# Patient Record
Sex: FEMALE | Race: WHITE | Employment: STUDENT | URBAN - METROPOLITAN AREA
[De-identification: names, ages, dates, MRNs, and addresses within clinical notes are randomized per-mention and may not be internally consistent; named-entity substitution may affect disease eponyms.]

---

## 2023-11-09 NOTE — PROGRESS NOTES
Patient here for (+) upt at home and states LMP- 9/25/23. Patient states she has had diarrhea since Friday and is not feeling well today.

## 2023-11-13 ENCOUNTER — TELEPHONE (OUTPATIENT)
Age: 33
End: 2023-11-13

## 2023-11-13 ENCOUNTER — OFFICE VISIT (OUTPATIENT)
Age: 33
End: 2023-11-13
Payer: COMMERCIAL

## 2023-11-13 ENCOUNTER — OFFICE VISIT (OUTPATIENT)
Age: 33
End: 2023-11-13

## 2023-11-13 ENCOUNTER — PROCEDURE VISIT (OUTPATIENT)
Age: 33
End: 2023-11-13
Payer: COMMERCIAL

## 2023-11-13 VITALS
WEIGHT: 200 LBS | TEMPERATURE: 97.8 F | BODY MASS INDEX: 34.15 KG/M2 | HEIGHT: 64 IN | SYSTOLIC BLOOD PRESSURE: 100 MMHG | OXYGEN SATURATION: 98 % | HEART RATE: 84 BPM | RESPIRATION RATE: 12 BRPM | DIASTOLIC BLOOD PRESSURE: 70 MMHG

## 2023-11-13 VITALS
SYSTOLIC BLOOD PRESSURE: 110 MMHG | HEIGHT: 64 IN | DIASTOLIC BLOOD PRESSURE: 74 MMHG | BODY MASS INDEX: 34.02 KG/M2 | HEART RATE: 92 BPM | WEIGHT: 199.3 LBS

## 2023-11-13 DIAGNOSIS — K52.9 GASTROENTERITIS: Primary | ICD-10-CM

## 2023-11-13 DIAGNOSIS — Z98.891 HISTORY OF CESAREAN SECTION: ICD-10-CM

## 2023-11-13 DIAGNOSIS — O21.9 NAUSEA AND VOMITING IN PREGNANCY: Primary | ICD-10-CM

## 2023-11-13 DIAGNOSIS — N91.2 AMENORRHEA: Primary | ICD-10-CM

## 2023-11-13 DIAGNOSIS — Z86.32 HISTORY OF INSULIN CONTROLLED GESTATIONAL DIABETES MELLITUS (GDM): ICD-10-CM

## 2023-11-13 DIAGNOSIS — Z3A.01 6 WEEKS GESTATION OF PREGNANCY: ICD-10-CM

## 2023-11-13 DIAGNOSIS — Z32.01 POSITIVE PREGNANCY TEST: ICD-10-CM

## 2023-11-13 PROBLEM — O24.414 INSULIN CONTROLLED GESTATIONAL DIABETES MELLITUS (GDM) IN THIRD TRIMESTER: Status: ACTIVE | Noted: 2022-02-15

## 2023-11-13 PROCEDURE — 76817 TRANSVAGINAL US OBSTETRIC: CPT | Performed by: OBSTETRICS & GYNECOLOGY

## 2023-11-13 PROCEDURE — 99203 OFFICE O/P NEW LOW 30 MIN: CPT | Performed by: STUDENT IN AN ORGANIZED HEALTH CARE EDUCATION/TRAINING PROGRAM

## 2023-11-13 PROCEDURE — 81025 URINE PREGNANCY TEST: CPT | Performed by: STUDENT IN AN ORGANIZED HEALTH CARE EDUCATION/TRAINING PROGRAM

## 2023-11-13 RX ORDER — ONDANSETRON 4 MG/1
4 TABLET, ORALLY DISINTEGRATING ORAL 3 TIMES DAILY PRN
Qty: 21 TABLET | Refills: 0 | Status: SHIPPED | OUTPATIENT
Start: 2023-11-13

## 2023-11-13 NOTE — TELEPHONE ENCOUNTER
Patient called stating for a week patient has been fatigued and had an upset stomach and stomach pain. States she has had diarrhea for 2 days . Patient is worried because she is 7 weeks pregnant. Patient has apt with her OBGYN this morning. Patient is wondering if she can be seen today. Patient can come in anytime after 1030am. Dr. Brenda Hutton does have an opening in the afternoon if needed.

## 2023-11-13 NOTE — PATIENT INSTRUCTIONS
- Upon examination, the heart sounds are normal. The abdomen is normal to palpation.  - If diarrhea is still present tomorrow, stool studies would be appropriate at that time. Stool studies and labs were ordered today in the event that they are necessary.

## 2023-11-13 NOTE — PROGRESS NOTES
[address] [address]     Date of Visit:  2023  Patient Name: Bert Lewis   Patient :  1990     CHIEF COMPLAINT/HPI:     Chief Complaint   Patient presents with    Abdominal Pain     Since Friday, has stomach pain with diarrhea. HPI      Bert Lewis, 34 y.o. presents today for evaluation of abdominal pain. Today, patient reports that became ill with cold symptoms slightly more than 1 week ago. She experienced fatigue, congestion, sweating, and chills at that time. These symptoms initially resolved after 3 days. She began experiencing stomach pain, nausea, diarrhea, and generalized stomach upset for the last 4 days starting this past Friday. States she went out to eat on Thursday night. Denies fever or chills with most recent symptoms. Denies classic heartburn symptoms. Endorses one event of vomiting on Thursday night. She has been experiencing nausea, but is 6 weeks pregnant and was nauseous in the same way with her first pregnancy. Recent visit with OBGYN today confirmed that her baby is currently healthy. States her gastric symptoms have been improving gradually. REVIEW OF SYSTEM      Review of Systems:   Constitutional:  Negative for chills, fatigue, fever and unexpected weight change. Eyes:  Negative for visual disturbance. Respiratory:  Negative for cough, chest tightness, shortness of breath and wheezing. Cardiovascular:  Negative for chest pain, palpitations and leg swelling. Gastrointestinal:  as above  Genitourinary:  Negative for dysuria, hematuria and urgency. Musculoskeletal:  Negative for back pain, neck pain and neck stiffness. Skin:  Negative for rash and wound. Neurological:  Negative for syncope, weakness, light-headedness and headaches. Hematological:  Negative for adenopathy. Does not bruise/bleed easily. Psychiatric/Behavioral:  Negative for suicidal ideas.  The

## 2023-11-13 NOTE — PROGRESS NOTES
6w1d IUP  HR: 120 bpm  RT. OVARY: Not visualized due to bowel  LT. OVARY: 3.27 x 2.20 x 2.54 cm  Corpus luteum: 1.47 x 1.19 x 1.57 cm  No free fluid  Subchorionic hemorrhage seen superior to gestational sac: 0.43 x 0.53 x 0.31 cm  Subchorionic hemorrhage seen inferior to the gestational sac: 0.83 x 0.49 x 0.44 cm

## 2023-11-13 NOTE — PROGRESS NOTES
Initial Prenatal Visit    [address] [facility],Third Floor      CC: Initial Prenatal Visit    HPI:   Quang Bolton is a 34 y.o. female [id number] at 7w0d by exact LMP  She is being seen today for her first obstetrical visit after positive pregnancy test at home. Pregnancy history fully reviewed. This is a planned pregnancy. Patient's last menstrual period was 2023 (exact date). Her obstetrical history is significant for GDMA2, history C/S x1 due to breech presentation. The patient desires a TOLAC in this pregnancy. The patient was seen and evaluated. The patient reports a current GI bug and about a week ago had some fevers and now has some nausea and diarrhea. She reports keeping fluids and food down. Her nausea is improving but she is still having loose stools. The patient declines the T-Dap Vaccine (27-36 weeks) this pregnancy. The patient is Rh positive and Rhogam is not indicated in this pregnancy. The patient declines the influenza vaccine this year. The patient declines the COVID-19 vaccine this year. Relationship with FOB:   Mother's ethnicity:   Father's ethnicity:   Family History:    - Neural tube defects: No   - Congenital birth defects (congenital heart defects, polydactyly, cleft lip/palate): No   - Intellectual disability: No   - Genetic disorders/chromosomal abnormalities: No   - Diabetes mellitus in first degree relatives: No  Genetic screening was discussed and patient is considering.     OB History:  OB History    Para Term  AB Living   4 1 1 0 2 1   SAB IAB Ectopic Molar Multiple Live Births   2 0 0 0 0 1      # Outcome Date GA Lbr Delio/2nd Weight Sex Delivery Anes PTL Lv   4 Current            3 Term 22 39w0d   M CS-LTranv   LESLIE      Birth Comments: breech   2 SAB 2021           1 SAB 10/2020               Past Medical History:  Past Medical History:   Diagnosis Date    Acne     ASCUS with positive high risk HPV cervical

## 2023-11-27 ENCOUNTER — PROCEDURE VISIT (OUTPATIENT)
Age: 33
End: 2023-11-27
Payer: COMMERCIAL

## 2023-11-27 DIAGNOSIS — O46.8X1 SUBCHORIONIC HEMORRHAGE OF PLACENTA IN FIRST TRIMESTER, SINGLE OR UNSPECIFIED FETUS: ICD-10-CM

## 2023-11-27 DIAGNOSIS — O41.8X10 SUBCHORIONIC HEMORRHAGE OF PLACENTA IN FIRST TRIMESTER, SINGLE OR UNSPECIFIED FETUS: ICD-10-CM

## 2023-11-27 PROCEDURE — 76817 TRANSVAGINAL US OBSTETRIC: CPT | Performed by: OBSTETRICS & GYNECOLOGY

## 2023-11-27 NOTE — PROGRESS NOTES
8w4d IUP  HR: 171 bpm  RT. OVARY: 2.92 x 2.20 x 1.67 cm  Unremarkable  LT. OVARY: 2.58 x 1.99 x 2.42 cm  Corpus luteum: 0.82 x 1.18 x 0.99 cm    Subchorionic hemorrhage previously seen superior to the gestational sac is no longer present  Subchorionic hemorrhage inferior to gestational sac still remains: 0.78 x 0.66 x 0.81 cm    No free fluid

## 2023-12-12 NOTE — PROGRESS NOTES
greater than 250 mg/dL: patient not sure if she has had checked  History of polycystic ovarian syndrome: Yes  A1c greater than or equal to 5.7%: no- (4.9) 1/4/22      ASSESSMENT/EDUCATION:     The following were addressed during this visit:    Trimester 1  - Blood Product Preferences   - Depression Screening   - Environmental/Occupational Hazards   - Genetic Discussion   - Immunizations/Disease Testing   - Medications   - Prenatal Care Expectations   - When to call your Doctor in Pregnancy   - Early Glucola (perform 16-18)     Education Trimester 1  - Infectious Disease Education   - Nutrition/Weight Gain   - Travel   - Lifestyle   - Prenatal Care and Practice Information   - Diet and Weight Gain   - Domestic Abuse Screen   - Breastfeeding Education: First Trimester   - Discuss Share Everywhere and MyChart with Patient   - Travel   - Infectious Disease Education   - Nutrition/Weight Gain        Pregnancy at 10w3d   She was counseled on office policies. She was educated about the anticipated course of prenatal care and routine prenatal labs. Patient has consented to HIV testing and urine drug screen: Yes  Initial Pathways Screen was completed: No-patient declined  Pregnancy Education Checklist initiated and documented above. The patient was counseled on carrier screening at prior visit. She requested CF/SMA/Fragile X testing. See scanned form. The patient has been counseled on the option for 1st trimester screen vs NIPs at prior visit. She verbalized understanding and would like to proceed with: NIPT. See scanned form. The patient was informed that Dr. Jerome Blankenship only delivers at [facility] and is agreeable to delivery at [facility]. She denies tobacco use. The patient was counseled on the risks of tobacco abuse, both maternal and fetal. She was instructed to stop smoking if currently using tobacco. Morbidity, mortality, and cessation programs were reviewed.  The risks include but

## 2023-12-13 ENCOUNTER — HOSPITAL ENCOUNTER (OUTPATIENT)
Age: 33
Setting detail: SPECIMEN
Discharge: HOME | End: 2023-12-13

## 2023-12-13 ENCOUNTER — INITIAL PRENATAL (OUTPATIENT)
Dept: OBGYN CLINIC | Age: 33
End: 2023-12-13

## 2023-12-13 VITALS
BODY MASS INDEX: 35.84 KG/M2 | SYSTOLIC BLOOD PRESSURE: 124 MMHG | HEART RATE: 82 BPM | WEIGHT: 208.8 LBS | DIASTOLIC BLOOD PRESSURE: 78 MMHG

## 2023-12-13 DIAGNOSIS — Z86.32 HISTORY OF INSULIN CONTROLLED GESTATIONAL DIABETES MELLITUS (GDM): ICD-10-CM

## 2023-12-13 DIAGNOSIS — O09.90 HIGH RISK PREGNANCY, ANTEPARTUM: Primary | ICD-10-CM

## 2023-12-13 DIAGNOSIS — Z98.891 HISTORY OF CESAREAN SECTION: ICD-10-CM

## 2023-12-13 PROCEDURE — 0500F INITIAL PRENATAL CARE VISIT: CPT | Performed by: STUDENT IN AN ORGANIZED HEALTH CARE EDUCATION/TRAINING PROGRAM

## 2023-12-13 SDOH — ECONOMIC STABILITY: FOOD INSECURITY: WITHIN THE PAST 12 MONTHS, YOU WORRIED THAT YOUR FOOD WOULD RUN OUT BEFORE YOU GOT MONEY TO BUY MORE.: NEVER TRUE

## 2023-12-13 SDOH — ECONOMIC STABILITY: HOUSING INSECURITY: IN THE LAST 12 MONTHS, HOW MANY PLACES HAVE YOU LIVED?: 1

## 2023-12-13 SDOH — ECONOMIC STABILITY: INCOME INSECURITY: IN THE LAST 12 MONTHS, WAS THERE A TIME WHEN YOU WERE NOT ABLE TO PAY THE MORTGAGE OR RENT ON TIME?: NO

## 2023-12-13 SDOH — ECONOMIC STABILITY: TRANSPORTATION INSECURITY
IN THE PAST 12 MONTHS, HAS THE LACK OF TRANSPORTATION KEPT YOU FROM MEDICAL APPOINTMENTS OR FROM GETTING MEDICATIONS?: NO

## 2023-12-13 SDOH — ECONOMIC STABILITY: HOUSING INSECURITY
IN THE LAST 12 MONTHS, WAS THERE A TIME WHEN YOU DID NOT HAVE A STEADY PLACE TO SLEEP OR SLEPT IN A SHELTER (INCLUDING NOW)?: NO

## 2023-12-13 SDOH — ECONOMIC STABILITY: TRANSPORTATION INSECURITY
IN THE PAST 12 MONTHS, HAS LACK OF TRANSPORTATION KEPT YOU FROM MEETINGS, WORK, OR FROM GETTING THINGS NEEDED FOR DAILY LIVING?: NO

## 2023-12-13 SDOH — ECONOMIC STABILITY: FOOD INSECURITY: WITHIN THE PAST 12 MONTHS, THE FOOD YOU BOUGHT JUST DIDN'T LAST AND YOU DIDN'T HAVE MONEY TO GET MORE.: NEVER TRUE

## 2023-12-13 ASSESSMENT — ANXIETY QUESTIONNAIRES
3. WORRYING TOO MUCH ABOUT DIFFERENT THINGS: 0
1. FEELING NERVOUS, ANXIOUS, OR ON EDGE: 0
6. BECOMING EASILY ANNOYED OR IRRITABLE: 0
2. NOT BEING ABLE TO STOP OR CONTROL WORRYING: 0
GAD7 TOTAL SCORE: 0
4. TROUBLE RELAXING: 0
5. BEING SO RESTLESS THAT IT IS HARD TO SIT STILL: 0
7. FEELING AFRAID AS IF SOMETHING AWFUL MIGHT HAPPEN: 0

## 2023-12-13 ASSESSMENT — SOCIAL DETERMINANTS OF HEALTH (SDOH)
WITHIN THE LAST YEAR, HAVE YOU BEEN AFRAID OF YOUR PARTNER OR EX-PARTNER?: NO
WITHIN THE LAST YEAR, HAVE TO BEEN RAPED OR FORCED TO HAVE ANY KIND OF SEXUAL ACTIVITY BY YOUR PARTNER OR EX-PARTNER?: NO
HOW HARD IS IT FOR YOU TO PAY FOR THE VERY BASICS LIKE FOOD, HOUSING, MEDICAL CARE, AND HEATING?: NOT HARD AT ALL
WITHIN THE LAST YEAR, HAVE YOU BEEN KICKED, HIT, SLAPPED, OR OTHERWISE PHYSICALLY HURT BY YOUR PARTNER OR EX-PARTNER?: NO
WITHIN THE LAST YEAR, HAVE YOU BEEN HUMILIATED OR EMOTIONALLY ABUSED IN OTHER WAYS BY YOUR PARTNER OR EX-PARTNER?: NO

## 2023-12-14 ENCOUNTER — HOSPITAL ENCOUNTER (OUTPATIENT)
Age: 33
Setting detail: SPECIMEN
Discharge: HOME | End: 2023-12-14

## 2023-12-14 DIAGNOSIS — O09.90 HIGH RISK PREGNANCY, ANTEPARTUM: ICD-10-CM

## 2023-12-14 LAB
ABO + RH BLD: NORMAL
AMPHET UR QL SCN: NEGATIVE
BARBITURATES UR QL SCN: NEGATIVE
BENZODIAZ UR QL: NEGATIVE
BLOOD GROUP ANTIBODIES SERPL: NEGATIVE
CANNABINOIDS UR QL SCN: NEGATIVE
COCAINE UR QL SCN: NEGATIVE
FENTANYL UR QL: NEGATIVE
HCV AB SERPL QL IA: NONREACTIVE
METHADONE UR QL: NEGATIVE
OPIATES UR QL SCN: NEGATIVE
OXYCODONE UR QL SCN: NEGATIVE
PCP UR QL SCN: NEGATIVE
TEST INFORMATION: NORMAL

## 2023-12-15 LAB
BASOPHILS # BLD: 0.04 K/UL (ref 0–0.2)
BASOPHILS NFR BLD: 0 % (ref 0–2)
CHLAMYDIA DNA UR QL NAA+PROBE: NEGATIVE
EOSINOPHIL # BLD: 0.25 K/UL (ref 0–0.44)
EOSINOPHILS RELATIVE PERCENT: 2 % (ref 1–4)
ERYTHROCYTE [DISTWIDTH] IN BLOOD BY AUTOMATED COUNT: 12.5 % (ref 11.8–14.4)
HBV SURFACE AG SERPL QL IA: NONREACTIVE
HCT VFR BLD AUTO: 39.2 % (ref 36.3–47.1)
HGB BLD-MCNC: 13.5 G/DL (ref 11.9–15.1)
HIV 1+2 AB+HIV1 P24 AG SERPL QL IA: NONREACTIVE
IMM GRANULOCYTES # BLD AUTO: 0.05 K/UL (ref 0–0.3)
IMM GRANULOCYTES NFR BLD: 0 %
LYMPHOCYTES NFR BLD: 1.99 K/UL (ref 1.1–3.7)
LYMPHOCYTES RELATIVE PERCENT: 17 % (ref 24–43)
MCH RBC QN AUTO: 29.2 PG (ref 25.2–33.5)
MCHC RBC AUTO-ENTMCNC: 34.4 G/DL (ref 28.4–34.8)
MCV RBC AUTO: 84.8 FL (ref 82.6–102.9)
MICROORGANISM SPEC CULT: NORMAL
MONOCYTES NFR BLD: 0.6 K/UL (ref 0.1–1.2)
MONOCYTES NFR BLD: 5 % (ref 3–12)
N GONORRHOEA DNA UR QL NAA+PROBE: NEGATIVE
NEUTROPHILS NFR BLD: 76 % (ref 36–65)
NEUTS SEG NFR BLD: 8.72 K/UL (ref 1.5–8.1)
NRBC BLD-RTO: 0 PER 100 WBC
PLATELET # BLD AUTO: 243 K/UL (ref 138–453)
PMV BLD AUTO: 9.9 FL (ref 8.1–13.5)
RBC # BLD AUTO: 4.62 M/UL (ref 3.95–5.11)
RUBV IGG SERPL QL IA: 38.3 IU/ML
SPECIMEN DESCRIPTION: NORMAL
SPECIMEN DESCRIPTION: NORMAL
T PALLIDUM AB SER QL IA: NONREACTIVE
VZV IGG SER QL IA: 1.33
WBC OTHER # BLD: 11.7 K/UL (ref 3.5–11.3)

## 2024-01-02 ENCOUNTER — HOSPITAL ENCOUNTER (OUTPATIENT)
Age: 34
Setting detail: SPECIMEN
Discharge: HOME | End: 2024-01-02

## 2024-01-02 ENCOUNTER — ROUTINE PRENATAL (OUTPATIENT)
Age: 34
End: 2024-01-02

## 2024-01-02 VITALS
DIASTOLIC BLOOD PRESSURE: 80 MMHG | WEIGHT: 208.3 LBS | SYSTOLIC BLOOD PRESSURE: 118 MMHG | BODY MASS INDEX: 35.75 KG/M2 | HEART RATE: 78 BPM

## 2024-01-02 DIAGNOSIS — Z3A.13 13 WEEKS GESTATION OF PREGNANCY: Primary | ICD-10-CM

## 2024-01-02 DIAGNOSIS — Z86.32 HISTORY OF INSULIN CONTROLLED GESTATIONAL DIABETES MELLITUS (GDM): ICD-10-CM

## 2024-01-02 DIAGNOSIS — O09.90 HIGH RISK PREGNANCY, ANTEPARTUM: ICD-10-CM

## 2024-01-02 PROBLEM — O09.291 HISTORY OF MACROSOMIA IN INFANT IN PRIOR PREGNANCY, CURRENTLY PREGNANT IN FIRST TRIMESTER: Status: ACTIVE | Noted: 2024-01-02

## 2024-01-02 PROCEDURE — 0502F SUBSEQUENT PRENATAL CARE: CPT | Performed by: STUDENT IN AN ORGANIZED HEALTH CARE EDUCATION/TRAINING PROGRAM

## 2024-01-02 NOTE — PROGRESS NOTES
Prenatal Visit    Zulma Clark is a 33 y.o. female  at 13w2d    Subjective:  The patient was seen and evaluated. She has no complaints. She is not yet feeling fetal movement. She denies contractions, vaginal bleeding and leakage of fluid. She had some bleeding early in pregnancy but hasn't had any recently. Signs and symptoms of  labor as well as labor were reviewed. Dates were reviewed with the patient. Estimated Date of Delivery: 24.          The patient declined the influenza vaccine this year.   The problem list reflects the active issues addressed during today's visit    VITALS:  BP: 118/80  Weight - Scale: 94.5 kg (208 lb 4.8 oz)  Pulse: 78  Patient Position: Sitting  Fetal HR: 155       General Appearance: Appears healthy.  Alert; in no acute distress.  Pleasant.  Skin: Skin color, texture, turgor normal. No rashes or lesions.  HEENT: normocephalic and atraumatic  Respiratory: no conversational dyspnea  Cardiovascular: normal rate and regular rhythm  Breast:  (Chest): normal appearance, no masses or tenderness, No nipple retraction or dimpling, No nipple discharge or bleeding, No axillary or supraclavicular adenopathy, Normal to palpation without dominant masses  Abdomen: soft, non-tender, non-distended, no right upper quadrant tenderness, and no CVA tenderness  Pelvic Exam:   Chaperone for Intimate Exam: Chaperone was offered and declined  External genitalia: normal general appearance without lesions  Urinary system: urethral meatus normal, bladder nontender  Vaginal: normal mucosa, scant discharge  Cervix: normal appearance without discharge or lesions, no CMT  Adnexa: nontender, no masses  Uterus: normal single, mobile, nontender  Musculoskeletal: no gross abnormalities  Extremities: non-tender BLE and non-edematous  Psych:  oriented to time, place and person, mood and affect are within normal limits      Assessment & Plan:  Zulma Clark is a 33 y.o. female  at 13w2d   -

## 2024-01-02 NOTE — PROGRESS NOTES
Pt is here today at her 13wk appt  Pt states last weekend she had some light pink spotting   Pt was not able to give a urine sample today

## 2024-01-04 LAB
HPV I/H RISK 4 DNA CVX QL NAA+PROBE: NOT DETECTED
HPV SAMPLE: NORMAL
HPV, INTERPRETATION: NORMAL
HPV16 DNA CVX QL NAA+PROBE: NOT DETECTED
HPV18 DNA CVX QL NAA+PROBE: NOT DETECTED
SPECIMEN DESCRIPTION: NORMAL

## 2024-01-09 LAB — CYTOLOGY REPORT: NORMAL

## 2024-01-16 ENCOUNTER — TELEPHONE (OUTPATIENT)
Dept: PERINATAL CARE | Age: 34
End: 2024-01-16

## 2024-01-16 ENCOUNTER — ROUTINE PRENATAL (OUTPATIENT)
Dept: PERINATAL CARE | Age: 34
End: 2024-01-16
Payer: COMMERCIAL

## 2024-01-16 VITALS
RESPIRATION RATE: 16 BRPM | DIASTOLIC BLOOD PRESSURE: 75 MMHG | BODY MASS INDEX: 35.85 KG/M2 | HEIGHT: 64 IN | HEART RATE: 80 BPM | WEIGHT: 210 LBS | SYSTOLIC BLOOD PRESSURE: 126 MMHG | TEMPERATURE: 98 F

## 2024-01-16 DIAGNOSIS — O24.119 PRE-EXISTING TYPE 2 DIABETES MELLITUS DURING PREGNANCY, ANTEPARTUM: Primary | ICD-10-CM

## 2024-01-16 DIAGNOSIS — O99.212 OBESITY AFFECTING PREGNANCY IN SECOND TRIMESTER, UNSPECIFIED OBESITY TYPE: ICD-10-CM

## 2024-01-16 DIAGNOSIS — O09.292 HISTORY OF MACROSOMIA IN INFANT IN PRIOR PREGNANCY, CURRENTLY PREGNANT IN SECOND TRIMESTER: ICD-10-CM

## 2024-01-16 DIAGNOSIS — O09.292 HISTORY OF GESTATIONAL DIABETES IN PRIOR PREGNANCY, CURRENTLY PREGNANT, SECOND TRIMESTER: ICD-10-CM

## 2024-01-16 DIAGNOSIS — Z86.32 HISTORY OF GESTATIONAL DIABETES IN PRIOR PREGNANCY, CURRENTLY PREGNANT, SECOND TRIMESTER: ICD-10-CM

## 2024-01-16 DIAGNOSIS — O34.219 PREVIOUS CESAREAN DELIVERY, ANTEPARTUM CONDITION OR COMPLICATION: ICD-10-CM

## 2024-01-16 PROBLEM — O24.319 MATERNAL PREGESTATIONAL DIABETES CLASSES B THROUGH R, ANTEPARTUM: Status: ACTIVE | Noted: 2024-01-16

## 2024-01-16 PROCEDURE — 99204 OFFICE O/P NEW MOD 45 MIN: CPT | Performed by: OBSTETRICS & GYNECOLOGY

## 2024-01-16 NOTE — TELEPHONE ENCOUNTER
Pt here in office and Dr. Mukherjee reviewed blood sugar log  Dr. Mukherjee ordered pt to begin NPH insulin at bedtime.  Pt agreed, no questions and asked to have called into [facility] pharmacy in [place], [place]   Writer called into above pharmacy, [phone], Crystal, NPH insulin, 10u, subq. daily at bedtime, with needles, syringes and alcohol pads, 24 week supply, no refills.

## 2024-01-29 ENCOUNTER — PATIENT MESSAGE (OUTPATIENT)
Dept: PERINATAL CARE | Age: 34
End: 2024-01-29

## 2024-01-30 ENCOUNTER — ROUTINE PRENATAL (OUTPATIENT)
Age: 34
End: 2024-01-30

## 2024-01-30 ENCOUNTER — TELEPHONE (OUTPATIENT)
Dept: PERINATAL CARE | Age: 34
End: 2024-01-30

## 2024-01-30 ENCOUNTER — HOSPITAL ENCOUNTER (OUTPATIENT)
Age: 34
Setting detail: SPECIMEN
Discharge: HOME | End: 2024-01-30

## 2024-01-30 VITALS
WEIGHT: 210.8 LBS | DIASTOLIC BLOOD PRESSURE: 62 MMHG | SYSTOLIC BLOOD PRESSURE: 118 MMHG | BODY MASS INDEX: 36.18 KG/M2 | HEART RATE: 76 BPM

## 2024-01-30 DIAGNOSIS — Z3A.17 17 WEEKS GESTATION OF PREGNANCY: Primary | ICD-10-CM

## 2024-01-30 DIAGNOSIS — O09.90 HIGH RISK PREGNANCY, ANTEPARTUM: ICD-10-CM

## 2024-01-30 DIAGNOSIS — O24.319 MATERNAL PREGESTATIONAL DIABETES CLASSES B THROUGH R, ANTEPARTUM: ICD-10-CM

## 2024-01-30 PROCEDURE — 0502F SUBSEQUENT PRENATAL CARE: CPT | Performed by: STUDENT IN AN ORGANIZED HEALTH CARE EDUCATION/TRAINING PROGRAM

## 2024-01-30 RX ORDER — INSULIN HUMAN 100 [IU]/ML
INJECTION, SUSPENSION SUBCUTANEOUS
COMMUNITY
Start: 2024-01-16

## 2024-01-30 NOTE — TELEPHONE ENCOUNTER
Zulma was contacted by phone and made aware that Dr. Mukherjee had reviewed her blood sugar log and is recommending Zulma to increase her NPH insulin to 20 units. Zulma is agreeable to his recommendations.

## 2024-01-30 NOTE — PROGRESS NOTES
Prenatal Visit    uZlma Clark is a 34 y.o. female  at 17w2d    Subjective:  The patient was seen and evaluated. She has no complaints. She reports Positive fetal movements. She denies contractions, vaginal bleeding and leakage of fluid. Signs and symptoms of  labor as well as labor were reviewed. Dates were reviewed with the patient. Estimated Date of Delivery: 24.          The patient declined the influenza vaccine this year.     The problem list reflects the active issues addressed during today's visit    VITALS:  BP: 118/62  Weight - Scale: 95.6 kg (210 lb 12.8 oz)  Pulse: 76  Patient Position: Sitting  Fetal HR: 145     Assessment & Plan:  Zulma Clark is a 34 y.o. female  at 17w2d   - Initial prenatal labs were reviewed and overall unremarkable   - An 18-22 week anatomy ultrasound has been scheduled 24   - NIPT low risk   - Carrier screening negative   - Desires AFP, drawn today   - MFM ordered P/C and A1c, also drawn today   - Influenza vaccination: R/B/A discussed and patient declines   - Continue daily PNV   - Epic problem list reviewed and updated   - Return in 4 weeks      Patient Active Problem List    Diagnosis Date Noted    Pregestational DM 2024 @ MFM: Start NPH 10u qHS       Hx Macrosomia 2024     G1, in the presence of uncontrolled GDMA2      High risk pregnancy, antepartum 2023    Hx GDMA2 2023     Declined oral GTT, requesting to check POCT, referral to MFM placed    24 @ MFM: Start NPH 10u qHS       Hx C/S x1 2023     Breech presentation, desires TOLAC      Irregular menses 2013    Acne 2013     Return in about 4 weeks (around 2024) for prenatal visit.    Andres Coronel [facility] OBGYN  2024, 11:56 AM

## 2024-01-31 DIAGNOSIS — O24.319 MATERNAL PREGESTATIONAL DIABETES CLASSES B THROUGH R, ANTEPARTUM: ICD-10-CM

## 2024-01-31 DIAGNOSIS — O09.90 HIGH RISK PREGNANCY, ANTEPARTUM: ICD-10-CM

## 2024-01-31 LAB
CREAT UR-MCNC: 71.6 MG/DL (ref 28–217)
EST. AVERAGE GLUCOSE BLD GHB EST-MCNC: 82 MG/DL
HBA1C MFR BLD: 4.5 % (ref 4–6)
TOTAL PROTEIN, URINE: 19 MG/DL
URINE TOTAL PROTEIN CREATININE RATIO: 0.27 (ref 0–0.2)

## 2024-02-02 LAB
AFP INTERPRETATION: NORMAL
AFP MOM: 0.9
AFP SPECIMEN: NORMAL
AFP: 29 NG/ML
DATE OF BIRTH: NORMAL
DATING METHOD: NORMAL
DETERMINED BY: NORMAL
DIABETIC: NO
DONOR EGG?: NORMAL
DUE DATE: NORMAL
ESTIMATED DUE DATE: NORMAL
FAMILY HISTORY NTD: NO
GESTATIONAL AGE: NORMAL
IN VITRO FERTILIZATION: NORMAL
INSULIN REQ DIABETES: NO
MATERNAL AGE AT EDD: 32.5 YR
MATERNAL WEIGHT: 210
MONOCHORIONIC TWINS: NORMAL
NUMBER OF FETUSES: NORMAL
PATIENT WEIGHT UNITS: NORMAL
PATIENT WEIGHT: NORMAL
RACE (MATERNAL): NORMAL
RACE: NORMAL
REPEAT SPECIMEN?: NO
SMOKING: NORMAL
SMOKING: NORMAL
VALPROIC/CARBAMAZEP: NORMAL
ZZ NTE CLEAN UP: HISTORY: NO

## 2024-02-19 ENCOUNTER — ROUTINE PRENATAL (OUTPATIENT)
Dept: PERINATAL CARE | Age: 34
End: 2024-02-19
Payer: COMMERCIAL

## 2024-02-19 VITALS
HEART RATE: 99 BPM | WEIGHT: 215.39 LBS | HEIGHT: 64 IN | DIASTOLIC BLOOD PRESSURE: 59 MMHG | SYSTOLIC BLOOD PRESSURE: 119 MMHG | RESPIRATION RATE: 16 BRPM | BODY MASS INDEX: 36.77 KG/M2 | TEMPERATURE: 97.9 F

## 2024-02-19 DIAGNOSIS — Z3A.20 20 WEEKS GESTATION OF PREGNANCY: ICD-10-CM

## 2024-02-19 DIAGNOSIS — O09.292 HISTORY OF MACROSOMIA IN INFANT IN PRIOR PREGNANCY, CURRENTLY PREGNANT IN SECOND TRIMESTER: ICD-10-CM

## 2024-02-19 DIAGNOSIS — O34.219 PREVIOUS CESAREAN DELIVERY, ANTEPARTUM CONDITION OR COMPLICATION: ICD-10-CM

## 2024-02-19 DIAGNOSIS — Z36.86 ENCOUNTER FOR SCREENING FOR RISK OF PRE-TERM LABOR: ICD-10-CM

## 2024-02-19 DIAGNOSIS — O99.212 OBESITY AFFECTING PREGNANCY IN SECOND TRIMESTER, UNSPECIFIED OBESITY TYPE: ICD-10-CM

## 2024-02-19 DIAGNOSIS — O34.592 ABNORMALITY OF UTERUS DURING PREGNANCY IN SECOND TRIMESTER: ICD-10-CM

## 2024-02-19 DIAGNOSIS — O09.292 HISTORY OF GESTATIONAL DIABETES IN PRIOR PREGNANCY, CURRENTLY PREGNANT, SECOND TRIMESTER: ICD-10-CM

## 2024-02-19 DIAGNOSIS — Z86.32 HISTORY OF GESTATIONAL DIABETES IN PRIOR PREGNANCY, CURRENTLY PREGNANT, SECOND TRIMESTER: ICD-10-CM

## 2024-02-19 DIAGNOSIS — O24.119 PRE-EXISTING TYPE 2 DIABETES MELLITUS DURING PREGNANCY, ANTEPARTUM: Primary | ICD-10-CM

## 2024-02-19 PROCEDURE — 76811 OB US DETAILED SNGL FETUS: CPT | Performed by: OBSTETRICS & GYNECOLOGY

## 2024-02-19 PROCEDURE — 76817 TRANSVAGINAL US OBSTETRIC: CPT | Performed by: OBSTETRICS & GYNECOLOGY

## 2024-02-19 PROCEDURE — 99999 PR OFFICE/OUTPT VISIT,PROCEDURE ONLY: CPT | Performed by: OBSTETRICS & GYNECOLOGY

## 2024-02-26 ENCOUNTER — TELEPHONE (OUTPATIENT)
Dept: PERINATAL CARE | Age: 34
End: 2024-02-26

## 2024-02-26 NOTE — TELEPHONE ENCOUNTER
Message from pt on My Chart stating needs refill on NPH insulin.  Writer called into [facility] pharmacy [phone], Erwin Champion insulin, 28u at bedtime, daily, 1mos supply with needles, no refills.

## 2024-02-27 ENCOUNTER — ROUTINE PRENATAL (OUTPATIENT)
Age: 34
End: 2024-02-27

## 2024-02-27 VITALS
BODY MASS INDEX: 36.58 KG/M2 | DIASTOLIC BLOOD PRESSURE: 68 MMHG | SYSTOLIC BLOOD PRESSURE: 118 MMHG | HEART RATE: 87 BPM | WEIGHT: 213.2 LBS

## 2024-02-27 DIAGNOSIS — R93.89 ABNORMAL ULTRASOUND: ICD-10-CM

## 2024-02-27 DIAGNOSIS — O24.319 MATERNAL PREGESTATIONAL DIABETES CLASSES B THROUGH R, ANTEPARTUM: ICD-10-CM

## 2024-02-27 DIAGNOSIS — Z3A.21 21 WEEKS GESTATION OF PREGNANCY: Primary | ICD-10-CM

## 2024-02-27 DIAGNOSIS — O09.90 HIGH RISK PREGNANCY, ANTEPARTUM: ICD-10-CM

## 2024-02-27 DIAGNOSIS — Z98.891 HISTORY OF CESAREAN SECTION: ICD-10-CM

## 2024-02-27 PROCEDURE — 0502F SUBSEQUENT PRENATAL CARE: CPT | Performed by: STUDENT IN AN ORGANIZED HEALTH CARE EDUCATION/TRAINING PROGRAM

## 2024-02-27 PROCEDURE — 3044F HG A1C LEVEL LT 7.0%: CPT | Performed by: STUDENT IN AN ORGANIZED HEALTH CARE EDUCATION/TRAINING PROGRAM

## 2024-02-27 RX ORDER — ASPIRIN 81 MG/1
81 TABLET ORAL DAILY
Qty: 90 TABLET | Refills: 1 | Status: CANCELLED | OUTPATIENT
Start: 2024-02-27

## 2024-02-27 NOTE — PROGRESS NOTES
Pt is here today at her 21wk appt  Pt states fetal movement is present  Pt has questions about her usn at [facility] and was unable to give a urine sample today

## 2024-02-27 NOTE — PROGRESS NOTES
Prenatal Visit    Zulma Clark is a 34 y.o. female  at 21w2d    The patient was seen and evaluated. She has no complaints at this time. Discussed anatomy ultrasound in depth with patient. Significant finding of no measureable SHELLIE. The patient still desires TOLAC. Discussed risks associated with TOLAC including uterine rupture and maternal and fetal compromise. Will continue to revisit trial of labor with advancing gestation. She reports positive fetal movements. She denies contractions, vaginal bleeding and leakage of fluid. Signs and symptoms of labor and pre-eclampsia were reviewed with the patient in detail. She is to report any of these if they occur. She currently denies signs or symptoms of pre-eclampsia including headache, vision changes, RUQ pain.    The patient declined the influenza vaccine this year.     The problem list reflects the active issues addressed during today's visit    Vitals:  BP: 118/68  Weight - Scale: 96.7 kg (213 lb 3.2 oz)  Pulse: 87  Patient Position: Sitting  Fundal Height (cm): 21 cm  Fetal HR: 140  Movement: Present     PRENATAL LAB RESULTS:   Blood Type/Rh: O pos  Antibody Screen: negative  Hemoglobin, Hematocrit, Platelets: 13.5/39.2/243  Rubella: immune  T. Pallidum, IgG: non-reactive  Hepatitis B Surface Antigen: non-reactive   Hepatitis C Antibody: non-reactive   HIV: nonreactive  Gonorrhea: negative  Chlamydia: negative  Urine culture: negative, date: 23  Urine Drug Screen: neg    Early 1 hour Glucose Tolerance Test: declined-desires to be considered pregestational DM    Group B Strep: **  Carrier Screen: negative  MSAFP: negative  Non-Invasive Prenatal Testing: low risk for aneuploidy  Anatomy US: posterior placenta, 3VC normal insertion, no fetal anomalies, no measureable SHELLIE    Last Pap: 24 NILM, HPV neg  TDaP: **  Flu: declines      Assessment & Plan:  Zulma Clark is a 34 y.o. female  at 21w2d   - Prenatal labs reviewed and unremarkable   -

## 2024-03-18 ENCOUNTER — TELEPHONE (OUTPATIENT)
Dept: PERINATAL CARE | Age: 34
End: 2024-03-18

## 2024-03-18 ENCOUNTER — ROUTINE PRENATAL (OUTPATIENT)
Dept: PERINATAL CARE | Age: 34
End: 2024-03-18
Payer: COMMERCIAL

## 2024-03-18 VITALS
SYSTOLIC BLOOD PRESSURE: 120 MMHG | RESPIRATION RATE: 16 BRPM | TEMPERATURE: 98.1 F | DIASTOLIC BLOOD PRESSURE: 60 MMHG | HEIGHT: 64 IN | HEART RATE: 90 BPM | BODY MASS INDEX: 36.7 KG/M2 | WEIGHT: 215 LBS

## 2024-03-18 DIAGNOSIS — O99.212 OBESITY AFFECTING PREGNANCY IN SECOND TRIMESTER, UNSPECIFIED OBESITY TYPE: ICD-10-CM

## 2024-03-18 DIAGNOSIS — Z36.4 ULTRASOUND FOR ANTENATAL SCREENING FOR FETAL GROWTH RESTRICTION: ICD-10-CM

## 2024-03-18 DIAGNOSIS — O34.592 ABNORMALITY OF UTERUS DURING PREGNANCY IN SECOND TRIMESTER: ICD-10-CM

## 2024-03-18 DIAGNOSIS — O09.292 HISTORY OF MACROSOMIA IN INFANT IN PRIOR PREGNANCY, CURRENTLY PREGNANT IN SECOND TRIMESTER: ICD-10-CM

## 2024-03-18 DIAGNOSIS — O34.219 PREVIOUS CESAREAN DELIVERY, ANTEPARTUM CONDITION OR COMPLICATION: ICD-10-CM

## 2024-03-18 DIAGNOSIS — Z3A.24 24 WEEKS GESTATION OF PREGNANCY: ICD-10-CM

## 2024-03-18 DIAGNOSIS — O24.119 PRE-EXISTING TYPE 2 DIABETES MELLITUS DURING PREGNANCY, ANTEPARTUM: Primary | ICD-10-CM

## 2024-03-18 PROCEDURE — 93325 DOPPLER ECHO COLOR FLOW MAPG: CPT | Performed by: OBSTETRICS & GYNECOLOGY

## 2024-03-18 PROCEDURE — 76825 ECHO EXAM OF FETAL HEART: CPT | Performed by: OBSTETRICS & GYNECOLOGY

## 2024-03-18 PROCEDURE — 99999 PR OFFICE/OUTPT VISIT,PROCEDURE ONLY: CPT | Performed by: OBSTETRICS & GYNECOLOGY

## 2024-03-18 PROCEDURE — 76827 ECHO EXAM OF FETAL HEART: CPT | Performed by: OBSTETRICS & GYNECOLOGY

## 2024-03-18 PROCEDURE — 76816 OB US FOLLOW-UP PER FETUS: CPT | Performed by: OBSTETRICS & GYNECOLOGY

## 2024-03-18 PROCEDURE — 76817 TRANSVAGINAL US OBSTETRIC: CPT | Performed by: OBSTETRICS & GYNECOLOGY

## 2024-03-18 NOTE — TELEPHONE ENCOUNTER
Pt here in office and Dr. Mukherjee reviewed blood sugar log.  Dr. Mukherjee ordered pt to increase NPH insulin to 36u at bedtime.  Pt agreed and asked to have called into [facility] pharmacy in [place], [place]   Writer called into above pharmacy, [phone], NPH insulin, 36u, with needles, 1mos supply and no refills.

## 2024-03-18 NOTE — PROGRESS NOTES
Blood sugars reviewed (insulin regimen adjusted, as below).     Insulin regimen increased to: NPH Insulin subcutaneously 36 units before bedtime  (consistently elevated fasting blood sugars above 90's).       Please continue to send blood glucose monitoring information to [facility] office so that insulin and/or dietary changes can be made if necessary weekly.  Diabetic education/nutrition counseling advised.   Start recommended ADA diet therapy for diabetes.   Compliance with regular prenatal care and blood sugar monitoring strongly encouraged.      Strongly advised patient to be compliant with blood sugar monitoring as previously advised to minimize the potential of adverse  outcomes (e.g. fetal demise/stillbirth, congenital birth/heart defects, polyhydramnios/oligohydramnios, fetal growth abnormalities, pregnancy loss, hypertensive disorders of pregnancy, indicated  delivery, etc.), potential maternal morbidities, etc.

## 2024-03-18 NOTE — PROGRESS NOTES
Encouraged pt to monitor blood sugars during the day, chandu. 2hrs after each meal.  Verb. importance

## 2024-03-19 ENCOUNTER — ROUTINE PRENATAL (OUTPATIENT)
Age: 34
End: 2024-03-19

## 2024-03-19 VITALS
DIASTOLIC BLOOD PRESSURE: 68 MMHG | SYSTOLIC BLOOD PRESSURE: 118 MMHG | HEART RATE: 91 BPM | WEIGHT: 214.6 LBS | BODY MASS INDEX: 36.84 KG/M2

## 2024-03-19 DIAGNOSIS — Z3A.24 24 WEEKS GESTATION OF PREGNANCY: Primary | ICD-10-CM

## 2024-03-19 DIAGNOSIS — R93.89 ABNORMAL ULTRASOUND: ICD-10-CM

## 2024-03-19 DIAGNOSIS — O09.90 HIGH RISK PREGNANCY, ANTEPARTUM: ICD-10-CM

## 2024-03-19 DIAGNOSIS — Z98.891 HISTORY OF CESAREAN SECTION: ICD-10-CM

## 2024-03-19 DIAGNOSIS — O24.319 MATERNAL PREGESTATIONAL DIABETES CLASSES B THROUGH R, ANTEPARTUM: ICD-10-CM

## 2024-03-19 PROCEDURE — 3044F HG A1C LEVEL LT 7.0%: CPT | Performed by: STUDENT IN AN ORGANIZED HEALTH CARE EDUCATION/TRAINING PROGRAM

## 2024-03-19 PROCEDURE — 0502F SUBSEQUENT PRENATAL CARE: CPT | Performed by: STUDENT IN AN ORGANIZED HEALTH CARE EDUCATION/TRAINING PROGRAM

## 2024-03-19 NOTE — PROGRESS NOTES
Prenatal Visit    Zulma Clark is a 34 y.o. female  at 24w2d    The patient was seen and evaluated. She has no complaints. She reports positive fetal movements. She denies contractions, vaginal bleeding and leakage of fluid. Signs and symptoms of labor and pre-eclampsia were reviewed with the patient in detail. She is to report any of these if they occur. She currently denies signs or symptoms of pre-eclampsia including headache, vision changes, RUQ pain.    The patient declined the influenza vaccine this year.     The problem list reflects the active issues addressed during today's visit    Vitals:  BP: 118/68  Weight - Scale: 97.3 kg (214 lb 9.6 oz)  Pulse: 91  Patient Position: Sitting  Fundal Height (cm): 24 cm  Fetal HR: 150     PRENATAL LAB RESULTS:   Blood Type/Rh: O pos  Antibody Screen: negative  Hemoglobin, Hematocrit, Platelets: 13.5/39.2/243  Rubella: immune  T. Pallidum, IgG: non-reactive  Hepatitis B Surface Antigen: non-reactive   Hepatitis C Antibody: non-reactive   HIV: nonreactive  Gonorrhea: negative  Chlamydia: negative  Urine culture: negative, date: 23  Urine Drug Screen: neg    Early 1 hour Glucose Tolerance Test: declined-desires to be considered pregestational DM    Group B Strep: **  Carrier Screen: negative  MSAFP: negative  Non-Invasive Prenatal Testing: low risk for aneuploidy  Anatomy US: posterior placenta, 3VC normal insertion, no fetal anomalies, no measureable SHELLIE    Last Pap: 24 NILM, HPV neg  TDaP: **  Flu: declines      Assessment & Plan:  Zulma Clakr is a 34 y.o. female  at 24w2d   - 28 week labs to be completed at next appointment, prenatal labs reviewed in detail   - An 18-22 week anatomy ultrasound has been completed and discussed   - MSAFP was ordered and negative   - NIPT was ordered and low risk for aneuploidy   - Influenza vaccination: R/B/A discussed and patient declines   - Continue PNV and ASA81   - Continue close follow up with MFM,

## 2024-04-10 ENCOUNTER — TELEPHONE (OUTPATIENT)
Dept: PERINATAL CARE | Age: 34
End: 2024-04-10

## 2024-04-10 NOTE — TELEPHONE ENCOUNTER
Patient was contacted by phone and made aware that Dr. Mukherjee reviewed her blood sugar log and is recommending she increase her nPH insulin to 40 units. Zulma is agreeable to his plan.

## 2024-04-15 ENCOUNTER — ROUTINE PRENATAL (OUTPATIENT)
Dept: PERINATAL CARE | Age: 34
End: 2024-04-15
Payer: COMMERCIAL

## 2024-04-15 VITALS
RESPIRATION RATE: 16 BRPM | BODY MASS INDEX: 37.05 KG/M2 | SYSTOLIC BLOOD PRESSURE: 122 MMHG | DIASTOLIC BLOOD PRESSURE: 64 MMHG | HEART RATE: 78 BPM | HEIGHT: 64 IN | TEMPERATURE: 98.1 F | WEIGHT: 217 LBS

## 2024-04-15 DIAGNOSIS — O09.293 HISTORY OF MACROSOMIA IN INFANT IN PRIOR PREGNANCY, CURRENTLY PREGNANT IN THIRD TRIMESTER: ICD-10-CM

## 2024-04-15 DIAGNOSIS — Z36.3 ENCOUNTER FOR ROUTINE SCREENING FOR MALFORMATION USING ULTRASONICS: ICD-10-CM

## 2024-04-15 DIAGNOSIS — O34.219 PREVIOUS CESAREAN DELIVERY, ANTEPARTUM CONDITION OR COMPLICATION: ICD-10-CM

## 2024-04-15 DIAGNOSIS — O24.119 PRE-EXISTING TYPE 2 DIABETES MELLITUS DURING PREGNANCY, ANTEPARTUM: Primary | ICD-10-CM

## 2024-04-15 DIAGNOSIS — O99.213 OBESITY AFFECTING PREGNANCY IN THIRD TRIMESTER, UNSPECIFIED OBESITY TYPE: ICD-10-CM

## 2024-04-15 DIAGNOSIS — Z3A.28 28 WEEKS GESTATION OF PREGNANCY: ICD-10-CM

## 2024-04-15 DIAGNOSIS — O34.593 ABNORMALITY OF UTERUS DURING PREGNANCY IN THIRD TRIMESTER: ICD-10-CM

## 2024-04-15 PROCEDURE — 99999 PR OFFICE/OUTPT VISIT,PROCEDURE ONLY: CPT | Performed by: OBSTETRICS & GYNECOLOGY

## 2024-04-15 PROCEDURE — 76819 FETAL BIOPHYS PROFIL W/O NST: CPT | Performed by: OBSTETRICS & GYNECOLOGY

## 2024-04-15 PROCEDURE — 76805 OB US >/= 14 WKS SNGL FETUS: CPT | Performed by: OBSTETRICS & GYNECOLOGY

## 2024-04-15 PROCEDURE — 76817 TRANSVAGINAL US OBSTETRIC: CPT | Performed by: OBSTETRICS & GYNECOLOGY

## 2024-04-15 NOTE — PROGRESS NOTES
Blood sugars reviewed (insulin regimen adjusted, as below).     Insulin regimen increased to: NPH Insulin subcutaneously 46 units before bedtime.

## 2024-04-16 ENCOUNTER — ROUTINE PRENATAL (OUTPATIENT)
Age: 34
End: 2024-04-16

## 2024-04-16 ENCOUNTER — HOSPITAL ENCOUNTER (OUTPATIENT)
Age: 34
Setting detail: SPECIMEN
Discharge: HOME | End: 2024-04-16

## 2024-04-16 VITALS
BODY MASS INDEX: 37.44 KG/M2 | HEART RATE: 88 BPM | SYSTOLIC BLOOD PRESSURE: 118 MMHG | DIASTOLIC BLOOD PRESSURE: 62 MMHG | WEIGHT: 218.1 LBS

## 2024-04-16 DIAGNOSIS — R93.89 ABNORMAL ULTRASOUND: ICD-10-CM

## 2024-04-16 DIAGNOSIS — O24.319 MATERNAL PREGESTATIONAL DIABETES CLASSES B THROUGH R, ANTEPARTUM: ICD-10-CM

## 2024-04-16 DIAGNOSIS — Z86.32 HISTORY OF INSULIN CONTROLLED GESTATIONAL DIABETES MELLITUS (GDM): ICD-10-CM

## 2024-04-16 DIAGNOSIS — Z98.891 HISTORY OF CESAREAN SECTION: ICD-10-CM

## 2024-04-16 DIAGNOSIS — Z3A.28 28 WEEKS GESTATION OF PREGNANCY: ICD-10-CM

## 2024-04-16 DIAGNOSIS — O09.291 HISTORY OF MACROSOMIA IN INFANT IN PRIOR PREGNANCY, CURRENTLY PREGNANT IN FIRST TRIMESTER: ICD-10-CM

## 2024-04-16 DIAGNOSIS — O09.90 HIGH RISK PREGNANCY, ANTEPARTUM: Primary | ICD-10-CM

## 2024-04-16 LAB
BASOPHILS # BLD: 0.04 K/UL (ref 0–0.2)
BASOPHILS NFR BLD: 0 % (ref 0–2)
EOSINOPHIL # BLD: 0.27 K/UL (ref 0–0.44)
EOSINOPHILS RELATIVE PERCENT: 3 % (ref 1–4)
ERYTHROCYTE [DISTWIDTH] IN BLOOD BY AUTOMATED COUNT: 14.4 % (ref 11.8–14.4)
HCT VFR BLD AUTO: 35.3 % (ref 36.3–47.1)
HGB BLD-MCNC: 11.6 G/DL (ref 11.9–15.1)
IMM GRANULOCYTES # BLD AUTO: 0.08 K/UL (ref 0–0.3)
IMM GRANULOCYTES NFR BLD: 1 %
LYMPHOCYTES NFR BLD: 1.42 K/UL (ref 1.1–3.7)
LYMPHOCYTES RELATIVE PERCENT: 14 % (ref 24–43)
MCH RBC QN AUTO: 29.6 PG (ref 25.2–33.5)
MCHC RBC AUTO-ENTMCNC: 32.9 G/DL (ref 28.4–34.8)
MCV RBC AUTO: 90.1 FL (ref 82.6–102.9)
MONOCYTES NFR BLD: 0.62 K/UL (ref 0.1–1.2)
MONOCYTES NFR BLD: 6 % (ref 3–12)
NEUTROPHILS NFR BLD: 76 % (ref 36–65)
NEUTS SEG NFR BLD: 8.04 K/UL (ref 1.5–8.1)
NRBC BLD-RTO: 0 PER 100 WBC
PLATELET # BLD AUTO: 153 K/UL (ref 138–453)
PMV BLD AUTO: 10.3 FL (ref 8.1–13.5)
RBC # BLD AUTO: 3.92 M/UL (ref 3.95–5.11)
WBC OTHER # BLD: 10.5 K/UL (ref 3.5–11.3)

## 2024-04-16 PROCEDURE — 0502F SUBSEQUENT PRENATAL CARE: CPT | Performed by: STUDENT IN AN ORGANIZED HEALTH CARE EDUCATION/TRAINING PROGRAM

## 2024-04-16 PROCEDURE — 3044F HG A1C LEVEL LT 7.0%: CPT | Performed by: STUDENT IN AN ORGANIZED HEALTH CARE EDUCATION/TRAINING PROGRAM

## 2024-04-16 NOTE — PROGRESS NOTES
Prenatal Visit    Zulma Clark is a 34 y.o. female  at 28w2d    The patient was seen and evaluated. She has no complaints today other than some allergy symptoms. She reports positive fetal movements. She denies contractions, vaginal bleeding and leakage of fluid. Signs and symptoms of labor and pre-eclampsia were reviewed with the patient in detail. She is to report any of these if they occur. She currently denies any of these.    The patient is Rh positive and Rhogam is not indicated in this pregnancy.  The patient declined the T-Dap Vaccine (27-36 weeks) this pregnancy.   The patient declined the influenza vaccine this year.     The problem list reflects the active issues addressed during today's visit    Vitals:  BP: 118/62  Weight - Scale: 98.9 kg (218 lb 1.6 oz)  Pulse: 88  Patient Position: Sitting  Fundal Height (cm): 29 cm  Fetal HR: 155  Movement: Present     PRENATAL LAB RESULTS:   Blood Type/Rh: O pos  Antibody Screen: negative  Hemoglobin, Hematocrit, Platelets: 13.5/39.2/243  Rubella: immune  T. Pallidum, IgG: non-reactive  Hepatitis B Surface Antigen: non-reactive   Hepatitis C Antibody: non-reactive   HIV: nonreactive  Gonorrhea: negative  Chlamydia: negative  Urine culture: negative, date: 23  Urine Drug Screen: neg    Early 1 hour Glucose Tolerance Test: declined-desires to be considered pregestational DM    Group B Strep: **  Carrier Screen: negative  MSAFP: negative  Non-Invasive Prenatal Testing: low risk for aneuploidy  Anatomy US: posterior placenta, 3VC normal insertion, no fetal anomalies, no measureable SHELLIE    Last Pap: 24 NILM, HPV neg  TDaP: declines  Flu: declines      Assessment & Plan:  Zulma Clark is a 34 y.o. female  at 28w2d   - 28 week labs ordered today   - Tdap vaccination: declined    - Influenza vaccination: declined    - Rhogam: is not indicated in this pregnancy    - Pt taking daily multivitamin, not taking aspirin   - Recommend

## 2024-04-17 LAB
MICROORGANISM SPEC CULT: NORMAL
SPECIMEN DESCRIPTION: NORMAL

## 2024-04-23 ENCOUNTER — TELEPHONE (OUTPATIENT)
Dept: PERINATAL CARE | Age: 34
End: 2024-04-23

## 2024-04-23 NOTE — TELEPHONE ENCOUNTER
Pt sent message on [employer] asking for refill on NPH insulin.  Writer asked pt to send in most recent blood sugars, d/t pt running out of insulin on April 19 - 4 days without insulin, Dr. Mukherjee is leaving insulin at 46u at bedtime daily.  Writer called into [facility] pharmacy, [phone], Linda, per pt's request, 46u, subq, daily at bedtime, with needles, 1mos supply and no refills.

## 2024-05-13 ENCOUNTER — ROUTINE PRENATAL (OUTPATIENT)
Dept: PERINATAL CARE | Age: 34
End: 2024-05-13
Payer: COMMERCIAL

## 2024-05-13 VITALS
HEIGHT: 64 IN | RESPIRATION RATE: 16 BRPM | TEMPERATURE: 97.5 F | BODY MASS INDEX: 37.75 KG/M2 | HEART RATE: 88 BPM | SYSTOLIC BLOOD PRESSURE: 114 MMHG | WEIGHT: 221.12 LBS | DIASTOLIC BLOOD PRESSURE: 54 MMHG

## 2024-05-13 DIAGNOSIS — O34.219 PREVIOUS CESAREAN DELIVERY, ANTEPARTUM CONDITION OR COMPLICATION: ICD-10-CM

## 2024-05-13 DIAGNOSIS — O99.213 OBESITY AFFECTING PREGNANCY IN THIRD TRIMESTER, UNSPECIFIED OBESITY TYPE: ICD-10-CM

## 2024-05-13 DIAGNOSIS — O09.293 HISTORY OF MACROSOMIA IN INFANT IN PRIOR PREGNANCY, CURRENTLY PREGNANT IN THIRD TRIMESTER: ICD-10-CM

## 2024-05-13 DIAGNOSIS — Z3A.32 32 WEEKS GESTATION OF PREGNANCY: ICD-10-CM

## 2024-05-13 DIAGNOSIS — O34.593 ABNORMALITY OF UTERUS DURING PREGNANCY IN THIRD TRIMESTER: ICD-10-CM

## 2024-05-13 DIAGNOSIS — O24.119 PRE-EXISTING TYPE 2 DIABETES MELLITUS DURING PREGNANCY, ANTEPARTUM: Primary | ICD-10-CM

## 2024-05-13 DIAGNOSIS — Z36.4 ULTRASOUND FOR ANTENATAL SCREENING FOR FETAL GROWTH RESTRICTION: ICD-10-CM

## 2024-05-13 DIAGNOSIS — Z36.3 ENCOUNTER FOR ROUTINE SCREENING FOR MALFORMATION USING ULTRASONICS: ICD-10-CM

## 2024-05-13 PROCEDURE — 76817 TRANSVAGINAL US OBSTETRIC: CPT | Performed by: OBSTETRICS & GYNECOLOGY

## 2024-05-13 PROCEDURE — 76816 OB US FOLLOW-UP PER FETUS: CPT | Performed by: OBSTETRICS & GYNECOLOGY

## 2024-05-13 PROCEDURE — 76819 FETAL BIOPHYS PROFIL W/O NST: CPT | Performed by: OBSTETRICS & GYNECOLOGY

## 2024-05-13 PROCEDURE — 99999 PR OFFICE/OUTPT VISIT,PROCEDURE ONLY: CPT | Performed by: OBSTETRICS & GYNECOLOGY

## 2024-05-13 PROCEDURE — 76827 ECHO EXAM OF FETAL HEART: CPT | Performed by: OBSTETRICS & GYNECOLOGY

## 2024-05-13 PROCEDURE — 76825 ECHO EXAM OF FETAL HEART: CPT | Performed by: OBSTETRICS & GYNECOLOGY

## 2024-05-13 PROCEDURE — 93325 DOPPLER ECHO COLOR FLOW MAPG: CPT | Performed by: OBSTETRICS & GYNECOLOGY

## 2024-05-13 NOTE — PROGRESS NOTES
Blood sugars reviewed (however, patient is only checking her blood sugar values sparsely and sporadically and has not taken any insulin doses since 2024 per verbal patient report).      Please continue to send blood glucose monitoring information to [facility] office so that insulin and/or dietary changes can be made if necessary weekly.  Diabetic education/nutrition counseling advised.   Start recommended ADA diet therapy for diabetes.   Compliance with regular prenatal care and blood sugar monitoring strongly encouraged.      Strongly advised patient to be compliant with blood sugar monitoring as previously advised to minimize the potential of adverse  outcomes (e.g. fetal demise/stillbirth, congenital birth/heart defects, polyhydramnios/oligohydramnios, fetal growth abnormalities, pregnancy loss, hypertensive disorders of pregnancy, indicated  delivery, etc.), potential maternal morbidities, etc.

## 2024-05-14 ENCOUNTER — ROUTINE PRENATAL (OUTPATIENT)
Dept: OBGYN CLINIC | Age: 34
End: 2024-05-14

## 2024-05-14 VITALS
DIASTOLIC BLOOD PRESSURE: 76 MMHG | SYSTOLIC BLOOD PRESSURE: 118 MMHG | HEART RATE: 78 BPM | WEIGHT: 220.1 LBS | BODY MASS INDEX: 37.78 KG/M2

## 2024-05-14 DIAGNOSIS — Z3A.32 32 WEEKS GESTATION OF PREGNANCY: ICD-10-CM

## 2024-05-14 DIAGNOSIS — O24.319 MATERNAL PREGESTATIONAL DIABETES CLASSES B THROUGH R, ANTEPARTUM: ICD-10-CM

## 2024-05-14 DIAGNOSIS — R93.89 ABNORMAL ULTRASOUND: ICD-10-CM

## 2024-05-14 DIAGNOSIS — Z86.32 HISTORY OF INSULIN CONTROLLED GESTATIONAL DIABETES MELLITUS (GDM): Primary | ICD-10-CM

## 2024-05-14 DIAGNOSIS — Z98.891 HISTORY OF CESAREAN SECTION: ICD-10-CM

## 2024-05-14 DIAGNOSIS — O09.90 HIGH RISK PREGNANCY, ANTEPARTUM: ICD-10-CM

## 2024-05-14 DIAGNOSIS — O09.291 HISTORY OF MACROSOMIA IN INFANT IN PRIOR PREGNANCY, CURRENTLY PREGNANT IN FIRST TRIMESTER: ICD-10-CM

## 2024-05-14 PROCEDURE — 3044F HG A1C LEVEL LT 7.0%: CPT | Performed by: STUDENT IN AN ORGANIZED HEALTH CARE EDUCATION/TRAINING PROGRAM

## 2024-05-14 PROCEDURE — 0502F SUBSEQUENT PRENATAL CARE: CPT | Performed by: STUDENT IN AN ORGANIZED HEALTH CARE EDUCATION/TRAINING PROGRAM

## 2024-05-14 ASSESSMENT — PATIENT HEALTH QUESTIONNAIRE - PHQ9
2. FEELING DOWN, DEPRESSED OR HOPELESS: NOT AT ALL
SUM OF ALL RESPONSES TO PHQ QUESTIONS 1-9: 0
SUM OF ALL RESPONSES TO PHQ QUESTIONS 1-9: 0
1. LITTLE INTEREST OR PLEASURE IN DOING THINGS: NOT AT ALL
SUM OF ALL RESPONSES TO PHQ QUESTIONS 1-9: 0
SUM OF ALL RESPONSES TO PHQ QUESTIONS 1-9: 0
SUM OF ALL RESPONSES TO PHQ9 QUESTIONS 1 & 2: 0

## 2024-05-14 NOTE — PROGRESS NOTES
Pt is here today at her 32.2 pnv with NST   Pt states fetal movement is good   Pt has no concerns     
error  
GUI Coronel [facility] OBGYN  5/15/2024, 11:22 AM

## 2024-05-21 ENCOUNTER — ROUTINE PRENATAL (OUTPATIENT)
Dept: OBGYN CLINIC | Age: 34
End: 2024-05-21

## 2024-05-21 VITALS — DIASTOLIC BLOOD PRESSURE: 66 MMHG | WEIGHT: 222.1 LBS | BODY MASS INDEX: 38.12 KG/M2 | SYSTOLIC BLOOD PRESSURE: 116 MMHG

## 2024-05-21 DIAGNOSIS — Z98.891 HISTORY OF CESAREAN SECTION: ICD-10-CM

## 2024-05-21 DIAGNOSIS — Z86.32 HISTORY OF INSULIN CONTROLLED GESTATIONAL DIABETES MELLITUS (GDM): ICD-10-CM

## 2024-05-21 DIAGNOSIS — Z3A.33 33 WEEKS GESTATION OF PREGNANCY: Primary | ICD-10-CM

## 2024-05-21 DIAGNOSIS — O09.291 HISTORY OF MACROSOMIA IN INFANT IN PRIOR PREGNANCY, CURRENTLY PREGNANT IN FIRST TRIMESTER: ICD-10-CM

## 2024-05-21 DIAGNOSIS — O09.90 HIGH RISK PREGNANCY, ANTEPARTUM: ICD-10-CM

## 2024-05-21 DIAGNOSIS — O24.319 MATERNAL PREGESTATIONAL DIABETES CLASSES B THROUGH R, ANTEPARTUM: ICD-10-CM

## 2024-05-21 DIAGNOSIS — R93.89 ABNORMAL ULTRASOUND: ICD-10-CM

## 2024-05-21 PROCEDURE — 3044F HG A1C LEVEL LT 7.0%: CPT | Performed by: STUDENT IN AN ORGANIZED HEALTH CARE EDUCATION/TRAINING PROGRAM

## 2024-05-21 PROCEDURE — 0502F SUBSEQUENT PRENATAL CARE: CPT | Performed by: STUDENT IN AN ORGANIZED HEALTH CARE EDUCATION/TRAINING PROGRAM

## 2024-05-21 NOTE — PROGRESS NOTES
Prenatal Visit    Zulma Clark is a 34 y.o. female  at 33w2d    The patient was seen and evaluated. She has no complaints. She reports positive fetal movements. She denies contractions, vaginal bleeding and leakage of fluid. Signs and symptoms of labor and pre-eclampsia were reviewed with the patient in detail. She is to report any of these if they occur. She currently denies any of these.    The patient is Rh positive and Rhogam is not indicated in this pregnancy.  The patient declined the T-Dap Vaccine (27-36 weeks) this pregnancy.   The patient declined the influenza vaccine this year.     The problem list reflects the active issues addressed during today's visit    Vitals:  BP: 116/66  Weight - Scale: 100.7 kg (222 lb 1.6 oz)  Patient Position: Sitting  Fundal Height (cm): 34 cm  Fetal HR: 140  Movement: Present     PRENATAL LAB RESULTS:   Blood Type/Rh: O pos  Antibody Screen: negative  Hemoglobin, Hematocrit, Platelets: 13.5/39.2/243  Rubella: immune  T. Pallidum, IgG: non-reactive  Hepatitis B Surface Antigen: non-reactive   Hepatitis C Antibody: non-reactive   HIV: nonreactive  Gonorrhea: negative  Chlamydia: negative  Urine culture: negative, date: 23  Urine Drug Screen: neg     Early 1 hour Glucose Tolerance Test: declined-desires to be considered pregestational DM     Group B Strep: **  Carrier Screen: negative  MSAFP: negative  Non-Invasive Prenatal Testing: low risk for aneuploidy  Anatomy US: posterior placenta, 3VC normal insertion, no fetal anomalies, no measureable SHELLIE     Last Pap: 24 NILM, HPV neg  TDaP: declines  Flu: declines    NST:   Fetal heart rate baseline: 140, moderate variability, accelerations present, decelerations absent  [place]: no contractions    The tracing has been reviewed and is considered reactive.       Assessment & Plan:  Zulma Clark is a 34 y.o. female  at 33w2d   - 28 week labs completed and reviewed with patient   - Tdap vaccination: declined

## 2024-05-28 ENCOUNTER — ROUTINE PRENATAL (OUTPATIENT)
Dept: OBGYN CLINIC | Age: 34
End: 2024-05-28
Payer: COMMERCIAL

## 2024-05-28 VITALS
DIASTOLIC BLOOD PRESSURE: 72 MMHG | HEART RATE: 89 BPM | WEIGHT: 226 LBS | BODY MASS INDEX: 38.79 KG/M2 | SYSTOLIC BLOOD PRESSURE: 116 MMHG

## 2024-05-28 DIAGNOSIS — O24.319 MATERNAL PREGESTATIONAL DIABETES CLASSES B THROUGH R, ANTEPARTUM: ICD-10-CM

## 2024-05-28 DIAGNOSIS — Z3A.34 34 WEEKS GESTATION OF PREGNANCY: Primary | ICD-10-CM

## 2024-05-28 DIAGNOSIS — O09.90 HIGH RISK PREGNANCY, ANTEPARTUM: ICD-10-CM

## 2024-05-28 DIAGNOSIS — R93.89 ABNORMAL ULTRASOUND: ICD-10-CM

## 2024-05-28 PROCEDURE — 59025 FETAL NON-STRESS TEST: CPT | Performed by: ADVANCED PRACTICE MIDWIFE

## 2024-05-28 PROCEDURE — 0502F SUBSEQUENT PRENATAL CARE: CPT | Performed by: ADVANCED PRACTICE MIDWIFE

## 2024-05-28 NOTE — PROGRESS NOTES
[facility] OBSTETRICS AND GYNECOLOGY  [address]   [address]  [place] [id number]  Dept: [phone]      Patient Name: Zulma Clark  Patient : 1990  MRN #: [id number]  [facility] #: [id number]    Date: 2024    Chief Complaint   Patient presents with    Routine Prenatal Visit     34.2     Patient's last menstrual period was 2023 (exact date).    SUBJECTIVE:    Zulma is here for her return OB visit.  She is 34w1d weeks pregnant.   She reports  feeling fetal movement.  She denies vaginal bleeding, vaginal discharge, leaking of fluid.  She denies uterine cramping.  She denies  nausea and/or vomiting.  She denies HA, visual changes, edema, or RUQ pain.     Fasting 90ish, not checking 2hr pp    OB History    Para Term  AB Living   4 1 1 0 2 1   SAB IAB Ectopic Molar Multiple Live Births   2 0 0     1      # Outcome Date GA Lbr Delio/2nd Weight Sex Delivery Anes PTL Lv   4 Current            3 Term 22 39w0d  4.082 kg (9 lb) M CS-LTranv EPI  LESLIE      Birth Comments: breech      Complications: Gestational diabetes mellitus (GDM)   2 SAB 2021           1 SAB 10/2020 6w0d    OTHER         Obstetric Comments   [id number]     Past Medical History:   Diagnosis Date    Acne     ASCUS with positive high risk HPV cervical 2015    Headache(784.0)     Irregular menses 2013     Past Surgical History:   Procedure Laterality Date    COLPOSCOPY  2015    Cx Bx BARI-1    HYSTEROSCOPY      WISDOM TOOTH EXTRACTION       Current Outpatient Medications   Medication Sig Dispense Refill    Prenatal Vit-DSS-Fe Cbn-FA (PRENATAL AD PO) Take by mouth daily      [employer] TRIPLE MAGNESIUM COMPLEX PO Take by mouth       No current facility-administered medications for this visit.     No Known Allergies    OBJECTIVE:  /72   Pulse 89   Wt 102.5 kg (226 lb)   LMP 2023 (Exact Date)   BMI 38.79 kg/m²   Wt

## 2024-06-04 ENCOUNTER — ROUTINE PRENATAL (OUTPATIENT)
Dept: OBGYN CLINIC | Age: 34
End: 2024-06-04
Payer: COMMERCIAL

## 2024-06-04 ENCOUNTER — HOSPITAL ENCOUNTER (OUTPATIENT)
Age: 34
Setting detail: SPECIMEN
Discharge: HOME | End: 2024-06-04

## 2024-06-04 VITALS
BODY MASS INDEX: 38.02 KG/M2 | DIASTOLIC BLOOD PRESSURE: 72 MMHG | SYSTOLIC BLOOD PRESSURE: 122 MMHG | HEART RATE: 102 BPM | WEIGHT: 221.5 LBS

## 2024-06-04 DIAGNOSIS — Z86.32 HISTORY OF INSULIN CONTROLLED GESTATIONAL DIABETES MELLITUS (GDM): ICD-10-CM

## 2024-06-04 DIAGNOSIS — Z98.891 HISTORY OF CESAREAN SECTION: ICD-10-CM

## 2024-06-04 DIAGNOSIS — O09.90 HIGH RISK PREGNANCY, ANTEPARTUM: Primary | ICD-10-CM

## 2024-06-04 DIAGNOSIS — O09.90 HIGH RISK PREGNANCY, ANTEPARTUM: ICD-10-CM

## 2024-06-04 DIAGNOSIS — O09.291 HISTORY OF MACROSOMIA IN INFANT IN PRIOR PREGNANCY, CURRENTLY PREGNANT IN FIRST TRIMESTER: ICD-10-CM

## 2024-06-04 DIAGNOSIS — O24.319 MATERNAL PREGESTATIONAL DIABETES CLASSES B THROUGH R, ANTEPARTUM: ICD-10-CM

## 2024-06-04 DIAGNOSIS — R93.89 ABNORMAL ULTRASOUND: ICD-10-CM

## 2024-06-04 DIAGNOSIS — Z3A.35 35 WEEKS GESTATION OF PREGNANCY: ICD-10-CM

## 2024-06-04 PROCEDURE — 59025 FETAL NON-STRESS TEST: CPT | Performed by: STUDENT IN AN ORGANIZED HEALTH CARE EDUCATION/TRAINING PROGRAM

## 2024-06-04 PROCEDURE — 99213 OFFICE O/P EST LOW 20 MIN: CPT | Performed by: STUDENT IN AN ORGANIZED HEALTH CARE EDUCATION/TRAINING PROGRAM

## 2024-06-04 PROCEDURE — 3044F HG A1C LEVEL LT 7.0%: CPT | Performed by: STUDENT IN AN ORGANIZED HEALTH CARE EDUCATION/TRAINING PROGRAM

## 2024-06-04 NOTE — PROGRESS NOTES
Pt is here today at her 35 wk appt  Pt states fetal movement is present  Pt has no complaints   
24  ASA81  Fetal echo wnl  NST @32wk  24: pt not currently on insulin      Hx Macrosomia 2024     G1, in the presence of uncontrolled GDMA2      High risk pregnancy, antepartum 2023    Hx GDMA2 2023     Declined oral GTT, requesting to check POCT, referral to MFM placed      Hx C/S x1 2023     Breech presentation, desires TOLAC   calculator 56.3%  Counseling completed 24      Acne 2013     Return in about 1 week (around 2024) for NST/prenatal appt.    Andres Coronel [facility] OBGYN  2024, 10:10 AM

## 2024-06-05 ENCOUNTER — TELEPHONE (OUTPATIENT)
Dept: PERINATAL CARE | Age: 34
End: 2024-06-05

## 2024-06-05 NOTE — TELEPHONE ENCOUNTER
Patient was contacted by phone and made aware that Dr. Mukherjee had reviewed her blood sugar log from 05/26/2024 - 05/31/2024 and would like for her to start NPH 6 units at bedtime. Patient is agreeable to his plan and stated she has insulin at home from before and does not need a Rx called into the pharmacy.

## 2024-06-08 LAB
MICROORGANISM SPEC CULT: ABNORMAL
SERVICE CMNT-IMP: ABNORMAL
SPECIMEN DESCRIPTION: ABNORMAL

## 2024-06-10 ENCOUNTER — ROUTINE PRENATAL (OUTPATIENT)
Dept: PERINATAL CARE | Age: 34
End: 2024-06-10
Payer: COMMERCIAL

## 2024-06-10 VITALS
HEIGHT: 64 IN | TEMPERATURE: 97.3 F | DIASTOLIC BLOOD PRESSURE: 53 MMHG | SYSTOLIC BLOOD PRESSURE: 99 MMHG | WEIGHT: 225.31 LBS | HEART RATE: 98 BPM | RESPIRATION RATE: 16 BRPM | BODY MASS INDEX: 38.47 KG/M2

## 2024-06-10 DIAGNOSIS — Z36.3 ENCOUNTER FOR ROUTINE SCREENING FOR MALFORMATION USING ULTRASONICS: ICD-10-CM

## 2024-06-10 DIAGNOSIS — O34.219 PREVIOUS CESAREAN DELIVERY, ANTEPARTUM CONDITION OR COMPLICATION: ICD-10-CM

## 2024-06-10 DIAGNOSIS — O24.119 PRE-EXISTING TYPE 2 DIABETES MELLITUS DURING PREGNANCY, ANTEPARTUM: Primary | ICD-10-CM

## 2024-06-10 DIAGNOSIS — O99.213 OBESITY AFFECTING PREGNANCY IN THIRD TRIMESTER, UNSPECIFIED OBESITY TYPE: ICD-10-CM

## 2024-06-10 DIAGNOSIS — O34.593 ABNORMALITY OF UTERUS DURING PREGNANCY IN THIRD TRIMESTER: ICD-10-CM

## 2024-06-10 DIAGNOSIS — O09.293 HISTORY OF MACROSOMIA IN INFANT IN PRIOR PREGNANCY, CURRENTLY PREGNANT IN THIRD TRIMESTER: ICD-10-CM

## 2024-06-10 DIAGNOSIS — Z3A.36 36 WEEKS GESTATION OF PREGNANCY: ICD-10-CM

## 2024-06-10 DIAGNOSIS — O40.9XX0 POLYHYDRAMNIOS, ANTEPARTUM, SINGLE OR UNSPECIFIED FETUS: ICD-10-CM

## 2024-06-10 PROCEDURE — 76817 TRANSVAGINAL US OBSTETRIC: CPT | Performed by: OBSTETRICS & GYNECOLOGY

## 2024-06-10 PROCEDURE — 76819 FETAL BIOPHYS PROFIL W/O NST: CPT | Performed by: OBSTETRICS & GYNECOLOGY

## 2024-06-10 PROCEDURE — 99999 PR OFFICE/OUTPT VISIT,PROCEDURE ONLY: CPT | Performed by: OBSTETRICS & GYNECOLOGY

## 2024-06-10 PROCEDURE — 76805 OB US >/= 14 WKS SNGL FETUS: CPT | Performed by: OBSTETRICS & GYNECOLOGY

## 2024-06-10 NOTE — PROGRESS NOTES
Blood sugars reviewed (however, patient is only checking her blood sugar values sparsely and sporadically and has not taken any insulin doses since 2024 per verbal patient report).      Please continue to send blood glucose monitoring information to [facility] office so that insulin and/or dietary changes can be made if necessary weekly.  Diabetic education/nutrition counseling advised.   Start recommended ADA diet therapy for diabetes.   Compliance with regular prenatal care and blood sugar monitoring strongly encouraged.      Strongly advised patient to be compliant with blood sugar monitoring as previously advised to minimize the potential of adverse  outcomes (e.g. fetal demise/stillbirth, congenital birth/heart defects, polyhydramnios/oligohydramnios, fetal growth abnormalities, pregnancy loss, hypertensive disorders of pregnancy, indicated  delivery, etc.), potential maternal morbidities, etc.

## 2024-06-11 ENCOUNTER — ROUTINE PRENATAL (OUTPATIENT)
Dept: OBGYN CLINIC | Age: 34
End: 2024-06-11
Payer: COMMERCIAL

## 2024-06-11 VITALS
SYSTOLIC BLOOD PRESSURE: 110 MMHG | HEART RATE: 84 BPM | BODY MASS INDEX: 38.52 KG/M2 | DIASTOLIC BLOOD PRESSURE: 70 MMHG | WEIGHT: 224.4 LBS

## 2024-06-11 DIAGNOSIS — O40.9XX0 POLYHYDRAMNIOS AFFECTING PREGNANCY: ICD-10-CM

## 2024-06-11 DIAGNOSIS — Z98.891 HISTORY OF CESAREAN SECTION: ICD-10-CM

## 2024-06-11 DIAGNOSIS — Z86.32 HISTORY OF INSULIN CONTROLLED GESTATIONAL DIABETES MELLITUS (GDM): ICD-10-CM

## 2024-06-11 DIAGNOSIS — O09.90 HIGH RISK PREGNANCY, ANTEPARTUM: Primary | ICD-10-CM

## 2024-06-11 DIAGNOSIS — O09.291 HISTORY OF MACROSOMIA IN INFANT IN PRIOR PREGNANCY, CURRENTLY PREGNANT IN FIRST TRIMESTER: ICD-10-CM

## 2024-06-11 DIAGNOSIS — Z3A.36 36 WEEKS GESTATION OF PREGNANCY: ICD-10-CM

## 2024-06-11 DIAGNOSIS — O24.319 MATERNAL PREGESTATIONAL DIABETES CLASSES B THROUGH R, ANTEPARTUM: ICD-10-CM

## 2024-06-11 DIAGNOSIS — R93.89 ABNORMAL ULTRASOUND: ICD-10-CM

## 2024-06-11 PROCEDURE — 0502F SUBSEQUENT PRENATAL CARE: CPT | Performed by: STUDENT IN AN ORGANIZED HEALTH CARE EDUCATION/TRAINING PROGRAM

## 2024-06-11 PROCEDURE — 59025 FETAL NON-STRESS TEST: CPT | Performed by: STUDENT IN AN ORGANIZED HEALTH CARE EDUCATION/TRAINING PROGRAM

## 2024-06-11 PROCEDURE — 3044F HG A1C LEVEL LT 7.0%: CPT | Performed by: STUDENT IN AN ORGANIZED HEALTH CARE EDUCATION/TRAINING PROGRAM

## 2024-06-11 NOTE — PROGRESS NOTES
Prenatal Visit    Zulma Clark is a 34 y.o. female  at 36w2d    The patient was seen and evaluated. She is complains of some round ligament pain. She reports positive fetal movements. She denies contractions, vaginal bleeding and leakage of fluid. Signs and symptoms of labor and pre-eclampsia were reviewed with the patient in detail. She is to report any of these if they occur. She currently denies any signs or symptoms of pre-clampsia including headache, vision changes, RUQ pain.    The patient is Rh positive and Rhogam is not indicated in this pregnancy.  The patient declined the T-Dap Vaccine (27-36 weeks) this pregnancy.   The patient declined the influenza vaccine this year.     The problem list reflects the active issues addressed during today's visit.    Allergies:  No Known Allergies    Vitals:  BP: 110/70  Weight - Scale: 101.8 kg (224 lb 6.4 oz)  Pulse: 84  Patient Position: Sitting  Fundal Height (cm): 37 cm  Fetal HR: 120  Movement: Present     PRENATAL LAB RESULTS:   Blood Type/Rh: O pos  Antibody Screen: negative  Hemoglobin, Hematocrit, Platelets: 13.5/39.2/243  Rubella: immune  T. Pallidum, IgG: non-reactive  Hepatitis B Surface Antigen: non-reactive   Hepatitis C Antibody: non-reactive   HIV: nonreactive  Gonorrhea: negative  Chlamydia: negative  Urine culture: negative, date: 23  Urine Drug Screen: neg     Early 1 hour Glucose Tolerance Test: declined-desires to be considered pregestational DM     Group B Strep: positive 24  Carrier Screen: negative  MSAFP: negative  Non-Invasive Prenatal Testing: low risk for aneuploidy  Anatomy US: posterior placenta, 3VC normal insertion, no fetal anomalies, no measureable SHELLIE     Last Pap: 24 NILM, HPV neg  TDaP: declines  Flu: declines    NST:   Fetal heart rate baseline: 120, moderate variability, accelerations present, decelerations absent  [place]: irregular contractions    The tracing has been reviewed and is considered reactive.

## 2024-06-18 ENCOUNTER — ROUTINE PRENATAL (OUTPATIENT)
Dept: OBGYN CLINIC | Age: 34
End: 2024-06-18
Payer: COMMERCIAL

## 2024-06-18 VITALS
BODY MASS INDEX: 38.19 KG/M2 | WEIGHT: 222.5 LBS | SYSTOLIC BLOOD PRESSURE: 120 MMHG | HEART RATE: 87 BPM | DIASTOLIC BLOOD PRESSURE: 70 MMHG

## 2024-06-18 DIAGNOSIS — Z98.891 HISTORY OF CESAREAN SECTION: ICD-10-CM

## 2024-06-18 DIAGNOSIS — Z3A.37 37 WEEKS GESTATION OF PREGNANCY: ICD-10-CM

## 2024-06-18 DIAGNOSIS — Z86.32 HISTORY OF INSULIN CONTROLLED GESTATIONAL DIABETES MELLITUS (GDM): ICD-10-CM

## 2024-06-18 DIAGNOSIS — O24.319 MATERNAL PREGESTATIONAL DIABETES CLASSES B THROUGH R, ANTEPARTUM: ICD-10-CM

## 2024-06-18 DIAGNOSIS — R93.89 ABNORMAL ULTRASOUND: ICD-10-CM

## 2024-06-18 DIAGNOSIS — O09.90 HIGH RISK PREGNANCY, ANTEPARTUM: Primary | ICD-10-CM

## 2024-06-18 DIAGNOSIS — O40.9XX0 POLYHYDRAMNIOS AFFECTING PREGNANCY: ICD-10-CM

## 2024-06-18 PROCEDURE — 3044F HG A1C LEVEL LT 7.0%: CPT | Performed by: STUDENT IN AN ORGANIZED HEALTH CARE EDUCATION/TRAINING PROGRAM

## 2024-06-18 PROCEDURE — 0502F SUBSEQUENT PRENATAL CARE: CPT | Performed by: STUDENT IN AN ORGANIZED HEALTH CARE EDUCATION/TRAINING PROGRAM

## 2024-06-18 PROCEDURE — 59025 FETAL NON-STRESS TEST: CPT | Performed by: STUDENT IN AN ORGANIZED HEALTH CARE EDUCATION/TRAINING PROGRAM

## 2024-06-18 NOTE — PROGRESS NOTES
Pt is here today at her 37wk appt  Pt states fetal movement is present  Pt has no complaints    
movements for up to one hour with a target value of ten movements. She was instructed to notify the office if she did not make that target after two attempts or if after any attempt there was less than four movements.   - Continue to discuss delivery plan. Today, the patient voices her desire to avoid an induction out of concern that it may end in a  section. At this time the patient desires to schedule a repeat  section within her delivery window. She does request a trial of labor if she was to present in spontaneous labor prior to her scheduled . Risks and benefits again discussed in depth.  - The patient was counseled on the need to choose her pediatrician for her baby.   - Epic problem list reviewed and updated   - Return to office in 1 week   - Will schedule for repeat  section on 24 @1200    Patient Active Problem List    Diagnosis Date Noted    Polyhydramnios affecting pregnancy 2024    No measureable SHELLIE 2024     Discussed r/b/a TOLAC counseling completed 24      Pregestational DM 2024     Declined GTT, elevated BS, desired to be called pregestational DM  Delivery window well controlled [id number]  24 @ MFM: Start NPH 10u qHS    24- stopped insulin, blood sugars remained controlled  Baseline P/C 0.27 24  ASA81  Fetal echo wnl  NST @32wk  24: pt not currently on insulin  24: MFM recommends 6U qHS. Pt reports fastings <90 and not taking. Strongly recommend compliance with fasting and 2hr PP blood sugar checks  24: Pt reports fastings now are >90 so restarted insulin. Checks fasting blood sugars, uses 12U NPH at night. Reports rarely checking postprandial      Hx Macrosomia 2024     G1, in the presence of uncontrolled GDMA2      High risk pregnancy, antepartum 2023    Hx GDMA2 2023     Declined oral GTT, requesting to check POCT, referral to [facility] placed        Hx C/S x1 2023     Breech presentation,

## 2024-06-24 ENCOUNTER — TELEPHONE (OUTPATIENT)
Dept: OBGYN CLINIC | Age: 34
End: 2024-06-24

## 2024-06-24 NOTE — TELEPHONE ENCOUNTER
Patient scheduled for repeat  on 2024 at 1200 at [facility].    Patient notified of  date. She was then informed of the place, date, arrival time, and time of surgery. The patient's surgery was discussed at her previous appointment at [place].  Questions were answered and patient was encouraged to contact [place] office if she had any other questions.  Surgery soap and written material will be provided at next appointment. Patient voiced understanding of the above.

## 2024-06-25 ENCOUNTER — ROUTINE PRENATAL (OUTPATIENT)
Dept: OBGYN CLINIC | Age: 34
End: 2024-06-25

## 2024-06-25 VITALS
WEIGHT: 225 LBS | BODY MASS INDEX: 38.62 KG/M2 | SYSTOLIC BLOOD PRESSURE: 110 MMHG | HEART RATE: 79 BPM | DIASTOLIC BLOOD PRESSURE: 76 MMHG

## 2024-06-25 DIAGNOSIS — R93.89 ABNORMAL ULTRASOUND: ICD-10-CM

## 2024-06-25 DIAGNOSIS — Z98.891 HISTORY OF CESAREAN SECTION: ICD-10-CM

## 2024-06-25 DIAGNOSIS — O09.90 HIGH RISK PREGNANCY, ANTEPARTUM: Primary | ICD-10-CM

## 2024-06-25 DIAGNOSIS — O24.319 MATERNAL PREGESTATIONAL DIABETES CLASSES B THROUGH R, ANTEPARTUM: ICD-10-CM

## 2024-06-25 DIAGNOSIS — O40.9XX0 POLYHYDRAMNIOS AFFECTING PREGNANCY: ICD-10-CM

## 2024-06-25 PROCEDURE — 0502F SUBSEQUENT PRENATAL CARE: CPT

## 2024-06-25 NOTE — PROGRESS NOTES
SUBJECTIVE:  Zulma is here for her routine OB visit & NST. Dr. Coronel patient.  She reports fetal movement.  She denies vaginal bleeding.  She denies leaking of fluid.  She denies vaginal discharge.  She reports uterine contraction activity on and off.  She denies nausea and/or vomiting.  She denies retaining fluid in her extremities  She denies headache, visual changes, epigastric discomfort  Zulma reports she is doing well.     OBJECTIVE:   Blood pressure 110/76, pulse 79, weight 102.1 kg (225 lb), last menstrual period 2023, not currently breastfeeding.    Pregravid BMI: 34.31   TW.3 kg (25 lb)    SVE:  deferred    Zulma has not received the Tdap vaccine as appropriate - declines  Rhogam was not indicated    NST:   Baseline:  130  Variability:  Moderate  Accelerations:  Present  Decelerations: Absent   Fetal Movement:  Perceived by patient during NST  Contractions: patient reports contractions, contractions present on toco (rare).  Interpretation: Reactive (Category1)  Time: 20 minutes    ASSESSMENT/PLAN:  IUP @ 38w2d  Zulma will monitor for fetal movements daily  [x]  GBS culture was positive.  [x]  28 week lab results were reviewed. Glucola (pregestational diabetes, see below), Hgb 11.6.   [x]  Signs of labor to report were  and when to call midwives was discussed  [x]  Birth preferences were discussed.  [x]  Signs and symptoms of Pre-Eclampsia were reviewed and discussed.    S = D    Zulma was seen today for routine prenatal visit.    Diagnoses and all orders for this visit:    High risk pregnancy, antepartum    Pregestational DM  12 U at night    Hx C/S x1  Repeat C/S scheduled per Dr. Coronel, pt does still desires TOLAC if labors spontaneously    Polyhydramnios affecting pregnancy    No measureable SHELLIE    Return for postop with Dr. Coronel    Zulma was counseled regarding all of the above    The patient, Zulma Clark, was seen with a total time spent of 35 minutes for the visit on

## 2024-06-30 NOTE — DISCHARGE SUMMARY
Obstetric Discharge Summary  [facility]    Patient Name: Zulma Clark  Patient : 1990  Primary Care Physician: Naeem Kessler MD  Admit Date: 2024    Principal Diagnosis: IUP at 39w0d, admitted for  Section (Repeat), hx CS x1, declining TOLAC     Her pregnancy has been complicated by:   Patient Active Problem List   Diagnosis    Acne    Hx GDMA2    Hx C/S x1    High risk pregnancy, antepartum    Hx Macrosomia    Pregestational DM    Status post repeat low transverse  section    RLTCS 24 M Apg 8/9 Wt 9#1    Pregestational diabetes mellitus, modified White class B    Status post  section     Infection Present?: No  Hospital Acquired: N/A    Surgical Operations & Procedures:  Analgesia: spinal  Delivery Type:  Delivery: : Low Cervical, Transverse   Laceration(s): See L&D summary    Consultations: NICU, and Anesthesia    Pertinent Findings & Procedures:   Zulma Clark is a 34 y.o. female  at 39w0d admitted for repeat CS with hx CS x1, declining TOLAC; received Tylenol, Bicitra, Pepcid, 2g Ancef,1g TXA, and a scopolamine patch. Risks, benefits, alternatives of  are discussed and patient is consented.    She delivered by repeat low transverse  a Live Born infant on 24.       Information for the patient's :  Hill Clark [[id number]]   male   Birth Weight: 4.115 kg (9 lb 1.2 oz)     Apgars: 9 at 1 minute and 9 at 5 minutes.     ERAS for  Section  Received ERAS education outpatient: Yes  Consumed electrolyte-rich clear fluid prior to surgery: Yes  Received pre-operative Tylenol and Pepcid: Yes  Received Scopolamine patch: Yes  Received post-operative scheduled Motrin and Tylenol: Yes  Cabrales catheter discontinued 12 hours post-operatively: Yes        ERAS requirements met (3/6): Yes    Postpartum course: normal.    POD #1: Hgb 11.5    Course of patient: uncomplicated    Discharge to:

## 2024-06-30 NOTE — H&P
OBSTETRICAL HISTORY AND PHYSICAL  [facility]    Date: 2024       Time: 10:46 AM   Patient Name: Zulma Clark     Patient : 1990  Room/Bed: REC1/[facility]1-01    Admission Date/Time: 2024  9:41 AM      CC: Scheduled repeat CS     HPI: Zulma Clark is a 34 y.o.  at 39w2d who presents for scheduled repeat  with history of  x1 and declining TOLAC     The patient reports fetal movement is present, denies contractions, denies loss of fluid, denies vaginal bleeding.  She denies HA, vision changes, SOB, chest pain, lightheadedness, dizziness, nausea, vomiting, dysuria, and hematuria.     DATING:  LMP: Patient's last menstrual period was 2023 (exact date).  Estimated Date of Delivery: 24   Based on: early ultrasound, at 6 1/7 weeks GA    PREGNANCY RISK FACTORS:  Patient Active Problem List   Diagnosis    Acne    Hx GDMA2    Hx C/S x1    High risk pregnancy, antepartum    Hx Macrosomia    Pregestational DM    No measureable SHELLIE    Polyhydramnios affecting pregnancy    39 weeks gestation of pregnancy        Steroids Given In This Pregnancy:  no     REVIEW OF SYSTEMS:   Constitutional: negative fever, negative chills, negative weight changes   HEENT: negative visual disturbances, negative headaches, negative dizziness, negative hearing loss  Breast: Negative breast abnormalities, negative breast lumps, negative nipple discharge  Respiratory: negative dyspnea, negative cough, negative SOB  Cardiovascular: negative chest pain,  negative palpitations, negative arrhythmia, negative syncope   Gastrointestinal: negative abdominal pain, negative RUQ pain, negative N/V, negative diarrhea, negative constipation, negative bowel changes, negative heartburn   Genitourinary: negative dysuria, negative hematuria, negative urinary incontinence, negative vaginal discharge, negative vaginal bleeding or spotting  Dermatological: negative rash, negative pruritis,  screen  First Trimester Screen: not done  msAFP: negative  Non-Invasive Prenatal Testing: low risk for aneuploidy  Anatomy US (24): posterior placenta, 3 VC, male, normal appearing anatomy, breech presentation (RESOLVED)    ASSESSMENT & PLAN:  Zulma Clark is a 34 y.o. female  at 39w2d IUP   - GBS positive / Rh positive / R immune   - No indication for GBS prophylaxis due to  route of delivery    Scheduled Repeat  Section  - Admit to labor and delivery under the service of Dr. Coronel    - VSS, afebrile    - Cat 1 FHT and TOCO showing irregular contractions   - BSUS: cephalic presentation, posterior placenta, EFW 8 lbs 5 oz   - CBC, CMP, P/C, T&S, T.Pal ordered   - UDS ordered. R/B/A discussed with patient and patient agreeable.   - Continue cEFM and TOCO pre-operatively    - NS IVF @ 125 mL/hr   - Ancef 2g, Bicitra, Tylenol, Scopolamine Patch, and Pepcid ordered preoperatively  - 1g TXA preoperatively    - Discussed R/B/A of  section including pain, bleeding, infection, damage to surrounding structures (bladder, ureters, bowel, nerves, arteries, and veins), scarring of the skin and within the abdomen, need for further surgeries, injury to the  (lacerations, fractures, brain bleeds, nerve injuries), anesthesia complications, post- op risks of blood clots or pneumonia. Patient reported understanding of risks and requested to proceed with  section.    - Consent form signed and in chart    - Anesthesia and NICU notified    - Patient is ready for transfer to the OR suite    Hx C/S x1, declining TOLAC   - G3, [facility],  due to breech presentation    Pregestational DM   - Declined GTT, presumed pregestational DM based on BG logs showing elevated fasting blood sugars  - [facility]  - NPH 12 at night; not ordered  - Fetal echo completed 3/18/24 and 24 showing no congenital heart defects     No measureable SHELLIE   - Seen on MFM US

## 2024-07-02 ENCOUNTER — ANESTHESIA EVENT (OUTPATIENT)
Dept: LABOR AND DELIVERY | Age: 34
End: 2024-07-02
Payer: COMMERCIAL

## 2024-07-02 ENCOUNTER — HOSPITAL ENCOUNTER (INPATIENT)
Age: 34
LOS: 2 days | Discharge: HOME | End: 2024-07-04
Attending: STUDENT IN AN ORGANIZED HEALTH CARE EDUCATION/TRAINING PROGRAM | Admitting: STUDENT IN AN ORGANIZED HEALTH CARE EDUCATION/TRAINING PROGRAM
Payer: COMMERCIAL

## 2024-07-02 ENCOUNTER — ANESTHESIA (OUTPATIENT)
Dept: LABOR AND DELIVERY | Age: 34
End: 2024-07-02
Payer: COMMERCIAL

## 2024-07-02 DIAGNOSIS — Z98.891 H/O: CESAREAN SECTION: Primary | ICD-10-CM

## 2024-07-02 PROBLEM — Z3A.39 39 WEEKS GESTATION OF PREGNANCY: Status: ACTIVE | Noted: 2024-07-02

## 2024-07-02 LAB
ABO + RH BLD: NORMAL
AMPHET UR QL SCN: NEGATIVE
ARM BAND NUMBER: NORMAL
BARBITURATES UR QL SCN: NEGATIVE
BENZODIAZ UR QL: NEGATIVE
BLOOD BANK SAMPLE EXPIRATION: NORMAL
BLOOD GROUP ANTIBODIES SERPL: NEGATIVE
CANNABINOIDS UR QL SCN: NEGATIVE
COCAINE UR QL SCN: NEGATIVE
ERYTHROCYTE [DISTWIDTH] IN BLOOD BY AUTOMATED COUNT: 13.9 % (ref 11.8–14.4)
FENTANYL UR QL: NEGATIVE
HCT VFR BLD AUTO: 36.2 % (ref 36.3–47.1)
HGB BLD-MCNC: 12.6 G/DL (ref 11.9–15.1)
MCH RBC QN AUTO: 29.7 PG (ref 25.2–33.5)
MCHC RBC AUTO-ENTMCNC: 34.8 G/DL (ref 28.4–34.8)
MCV RBC AUTO: 85.4 FL (ref 82.6–102.9)
METHADONE UR QL: NEGATIVE
NRBC BLD-RTO: 0 PER 100 WBC
OPIATES UR QL SCN: NEGATIVE
OXYCODONE UR QL SCN: NEGATIVE
PCP UR QL SCN: NEGATIVE
PLATELET # BLD AUTO: 162 K/UL (ref 138–453)
PMV BLD AUTO: 10.5 FL (ref 8.1–13.5)
RBC # BLD AUTO: 4.24 M/UL (ref 3.95–5.11)
T PALLIDUM AB SER QL IA: NONREACTIVE
TEST INFORMATION: NORMAL
WBC OTHER # BLD: 11.7 K/UL (ref 3.5–11.3)

## 2024-07-02 PROCEDURE — 80307 DRUG TEST PRSMV CHEM ANLYZR: CPT

## 2024-07-02 PROCEDURE — 6360000002 HC RX W HCPCS

## 2024-07-02 PROCEDURE — 86850 RBC ANTIBODY SCREEN: CPT

## 2024-07-02 PROCEDURE — 2580000003 HC RX 258: Performed by: SPECIALIST

## 2024-07-02 PROCEDURE — 6370000000 HC RX 637 (ALT 250 FOR IP)

## 2024-07-02 PROCEDURE — 86901 BLOOD TYPING SEROLOGIC RH(D): CPT

## 2024-07-02 PROCEDURE — 1220000000 HC SEMI PRIVATE OB R&B

## 2024-07-02 PROCEDURE — 6360000002 HC RX W HCPCS: Performed by: SPECIALIST

## 2024-07-02 PROCEDURE — 59510 CESAREAN DELIVERY: CPT | Performed by: STUDENT IN AN ORGANIZED HEALTH CARE EDUCATION/TRAINING PROGRAM

## 2024-07-02 PROCEDURE — 3700000000 HC ANESTHESIA ATTENDED CARE: Performed by: STUDENT IN AN ORGANIZED HEALTH CARE EDUCATION/TRAINING PROGRAM

## 2024-07-02 PROCEDURE — 86780 TREPONEMA PALLIDUM: CPT

## 2024-07-02 PROCEDURE — 3700000001 HC ADD 15 MINUTES (ANESTHESIA): Performed by: STUDENT IN AN ORGANIZED HEALTH CARE EDUCATION/TRAINING PROGRAM

## 2024-07-02 PROCEDURE — 2500000003 HC RX 250 WO HCPCS

## 2024-07-02 PROCEDURE — 86900 BLOOD TYPING SEROLOGIC ABO: CPT

## 2024-07-02 PROCEDURE — 85027 COMPLETE CBC AUTOMATED: CPT

## 2024-07-02 PROCEDURE — 88307 TISSUE EXAM BY PATHOLOGIST: CPT

## 2024-07-02 PROCEDURE — 7100000001 HC PACU RECOVERY - ADDTL 15 MIN: Performed by: STUDENT IN AN ORGANIZED HEALTH CARE EDUCATION/TRAINING PROGRAM

## 2024-07-02 PROCEDURE — 2580000003 HC RX 258

## 2024-07-02 PROCEDURE — 3609079900 HC CESAREAN SECTION: Performed by: STUDENT IN AN ORGANIZED HEALTH CARE EDUCATION/TRAINING PROGRAM

## 2024-07-02 PROCEDURE — 7100000000 HC PACU RECOVERY - FIRST 15 MIN: Performed by: STUDENT IN AN ORGANIZED HEALTH CARE EDUCATION/TRAINING PROGRAM

## 2024-07-02 RX ORDER — IBUPROFEN 600 MG/1
600 TABLET ORAL EVERY 6 HOURS
Status: DISCONTINUED | OUTPATIENT
Start: 2024-07-03 | End: 2024-07-03

## 2024-07-02 RX ORDER — ACETAMINOPHEN 500 MG
1000 TABLET ORAL ONCE
Status: COMPLETED | OUTPATIENT
Start: 2024-07-02 | End: 2024-07-02

## 2024-07-02 RX ORDER — POLYETHYLENE GLYCOL 3350 17 G/17G
17 POWDER, FOR SOLUTION ORAL DAILY PRN
Status: DISCONTINUED | OUTPATIENT
Start: 2024-07-02 | End: 2024-07-04 | Stop reason: HOSPADM

## 2024-07-02 RX ORDER — SIMETHICONE 80 MG
80 TABLET,CHEWABLE ORAL EVERY 6 HOURS PRN
Status: DISCONTINUED | OUTPATIENT
Start: 2024-07-02 | End: 2024-07-04 | Stop reason: HOSPADM

## 2024-07-02 RX ORDER — 0.9 % SODIUM CHLORIDE 0.9 %
1000 INTRAVENOUS SOLUTION INTRAVENOUS ONCE
Status: COMPLETED | OUTPATIENT
Start: 2024-07-02 | End: 2024-07-02

## 2024-07-02 RX ORDER — SODIUM CHLORIDE, SODIUM LACTATE, POTASSIUM CHLORIDE, CALCIUM CHLORIDE 600; 310; 30; 20 MG/100ML; MG/100ML; MG/100ML; MG/100ML
INJECTION, SOLUTION INTRAVENOUS CONTINUOUS
Status: DISCONTINUED | OUTPATIENT
Start: 2024-07-02 | End: 2024-07-03

## 2024-07-02 RX ORDER — TRANEXAMIC ACID 10 MG/ML
1000 INJECTION, SOLUTION INTRAVENOUS ONCE
Status: COMPLETED | OUTPATIENT
Start: 2024-07-02 | End: 2024-07-02

## 2024-07-02 RX ORDER — IBUPROFEN 600 MG/1
600 TABLET ORAL EVERY 6 HOURS PRN
Qty: 120 TABLET | Refills: 1 | Status: SHIPPED | OUTPATIENT
Start: 2024-07-02 | End: 2024-08-31

## 2024-07-02 RX ORDER — SENNA AND DOCUSATE SODIUM 50; 8.6 MG/1; MG/1
1 TABLET, FILM COATED ORAL 2 TIMES DAILY
Status: DISCONTINUED | OUTPATIENT
Start: 2024-07-02 | End: 2024-07-04 | Stop reason: HOSPADM

## 2024-07-02 RX ORDER — BUPIVACAINE HYDROCHLORIDE 7.5 MG/ML
INJECTION, SOLUTION INTRASPINAL PRN
Status: DISCONTINUED | OUTPATIENT
Start: 2024-07-02 | End: 2024-07-02 | Stop reason: SDUPTHER

## 2024-07-02 RX ORDER — SIMETHICONE 80 MG
80 TABLET,CHEWABLE ORAL 4 TIMES DAILY PRN
Qty: 60 TABLET | Refills: 1 | Status: SHIPPED | OUTPATIENT
Start: 2024-07-02 | End: 2024-07-04 | Stop reason: HOSPADM

## 2024-07-02 RX ORDER — MORPHINE SULFATE 1 MG/ML
INJECTION, SOLUTION EPIDURAL; INTRATHECAL; INTRAVENOUS PRN
Status: DISCONTINUED | OUTPATIENT
Start: 2024-07-02 | End: 2024-07-02 | Stop reason: SDUPTHER

## 2024-07-02 RX ORDER — SODIUM CHLORIDE 0.9 % (FLUSH) 0.9 %
5-40 SYRINGE (ML) INJECTION EVERY 12 HOURS SCHEDULED
Status: DISCONTINUED | OUTPATIENT
Start: 2024-07-02 | End: 2024-07-04 | Stop reason: HOSPADM

## 2024-07-02 RX ORDER — ONDANSETRON 2 MG/ML
INJECTION INTRAMUSCULAR; INTRAVENOUS PRN
Status: DISCONTINUED | OUTPATIENT
Start: 2024-07-02 | End: 2024-07-02 | Stop reason: SDUPTHER

## 2024-07-02 RX ORDER — SODIUM CHLORIDE, SODIUM LACTATE, POTASSIUM CHLORIDE, AND CALCIUM CHLORIDE .6; .31; .03; .02 G/100ML; G/100ML; G/100ML; G/100ML
1000 INJECTION, SOLUTION INTRAVENOUS ONCE
Status: DISCONTINUED | OUTPATIENT
Start: 2024-07-02 | End: 2024-07-03

## 2024-07-02 RX ORDER — OXYCODONE HYDROCHLORIDE 5 MG/1
10 TABLET ORAL EVERY 4 HOURS PRN
Status: DISCONTINUED | OUTPATIENT
Start: 2024-07-02 | End: 2024-07-04 | Stop reason: HOSPADM

## 2024-07-02 RX ORDER — SODIUM CHLORIDE 9 MG/ML
INJECTION, SOLUTION INTRAVENOUS PRN
Status: DISCONTINUED | OUTPATIENT
Start: 2024-07-02 | End: 2024-07-02

## 2024-07-02 RX ORDER — ACETAMINOPHEN 500 MG
1000 TABLET ORAL EVERY 6 HOURS
Status: DISCONTINUED | OUTPATIENT
Start: 2024-07-02 | End: 2024-07-04 | Stop reason: HOSPADM

## 2024-07-02 RX ORDER — SODIUM CHLORIDE 0.9 % (FLUSH) 0.9 %
10 SYRINGE (ML) INJECTION EVERY 12 HOURS SCHEDULED
Status: DISCONTINUED | OUTPATIENT
Start: 2024-07-02 | End: 2024-07-02

## 2024-07-02 RX ORDER — ACETAMINOPHEN 500 MG
1000 TABLET ORAL EVERY 6 HOURS PRN
Qty: 120 TABLET | Refills: 1 | Status: SHIPPED | OUTPATIENT
Start: 2024-07-02

## 2024-07-02 RX ORDER — SODIUM CHLORIDE 9 MG/ML
INJECTION, SOLUTION INTRAVENOUS PRN
Status: DISCONTINUED | OUTPATIENT
Start: 2024-07-02 | End: 2024-07-04 | Stop reason: HOSPADM

## 2024-07-02 RX ORDER — KETOROLAC TROMETHAMINE 30 MG/ML
30 INJECTION, SOLUTION INTRAMUSCULAR; INTRAVENOUS EVERY 6 HOURS
Status: DISCONTINUED | OUTPATIENT
Start: 2024-07-02 | End: 2024-07-03 | Stop reason: SDUPTHER

## 2024-07-02 RX ORDER — ONDANSETRON 2 MG/ML
4 INJECTION INTRAMUSCULAR; INTRAVENOUS EVERY 6 HOURS PRN
Status: DISCONTINUED | OUTPATIENT
Start: 2024-07-02 | End: 2024-07-02

## 2024-07-02 RX ORDER — SODIUM CHLORIDE 0.9 % (FLUSH) 0.9 %
10 SYRINGE (ML) INJECTION PRN
Status: DISCONTINUED | OUTPATIENT
Start: 2024-07-02 | End: 2024-07-02

## 2024-07-02 RX ORDER — BISACODYL 10 MG
10 SUPPOSITORY, RECTAL RECTAL DAILY PRN
Status: DISCONTINUED | OUTPATIENT
Start: 2024-07-02 | End: 2024-07-04 | Stop reason: HOSPADM

## 2024-07-02 RX ORDER — ONDANSETRON 2 MG/ML
4 INJECTION INTRAMUSCULAR; INTRAVENOUS EVERY 6 HOURS PRN
Status: DISCONTINUED | OUTPATIENT
Start: 2024-07-02 | End: 2024-07-04 | Stop reason: HOSPADM

## 2024-07-02 RX ORDER — LANOLIN 72 %
OINTMENT (GRAM) TOPICAL
Status: DISCONTINUED | OUTPATIENT
Start: 2024-07-02 | End: 2024-07-04 | Stop reason: HOSPADM

## 2024-07-02 RX ORDER — SCOLOPAMINE TRANSDERMAL SYSTEM 1 MG/1
1 PATCH, EXTENDED RELEASE TRANSDERMAL ONCE
Status: DISCONTINUED | OUTPATIENT
Start: 2024-07-02 | End: 2024-07-04 | Stop reason: HOSPADM

## 2024-07-02 RX ORDER — OXYCODONE HYDROCHLORIDE 5 MG/1
5 TABLET ORAL EVERY 4 HOURS PRN
Status: DISCONTINUED | OUTPATIENT
Start: 2024-07-02 | End: 2024-07-04 | Stop reason: HOSPADM

## 2024-07-02 RX ORDER — SIMETHICONE 80 MG
80 TABLET,CHEWABLE ORAL 4 TIMES DAILY PRN
Qty: 60 TABLET | Refills: 1 | Status: SHIPPED | OUTPATIENT
Start: 2024-07-02

## 2024-07-02 RX ORDER — CITRIC ACID/SODIUM CITRATE 334-500MG
30 SOLUTION, ORAL ORAL ONCE
Status: COMPLETED | OUTPATIENT
Start: 2024-07-02 | End: 2024-07-02

## 2024-07-02 RX ORDER — SODIUM CHLORIDE 9 MG/ML
INJECTION, SOLUTION INTRAVENOUS CONTINUOUS PRN
Status: DISCONTINUED | OUTPATIENT
Start: 2024-07-02 | End: 2024-07-02 | Stop reason: SDUPTHER

## 2024-07-02 RX ORDER — OXYCODONE HYDROCHLORIDE 5 MG/1
5 TABLET ORAL EVERY 6 HOURS PRN
Qty: 20 TABLET | Refills: 0 | Status: SHIPPED | OUTPATIENT
Start: 2024-07-02 | End: 2024-07-09

## 2024-07-02 RX ORDER — ENOXAPARIN SODIUM 100 MG/ML
40 INJECTION SUBCUTANEOUS DAILY
Status: DISCONTINUED | OUTPATIENT
Start: 2024-07-03 | End: 2024-07-04 | Stop reason: HOSPADM

## 2024-07-02 RX ORDER — CEPHALEXIN 500 MG/1
500 CAPSULE ORAL EVERY 8 HOURS SCHEDULED
Status: DISCONTINUED | OUTPATIENT
Start: 2024-07-02 | End: 2024-07-04 | Stop reason: HOSPADM

## 2024-07-02 RX ORDER — ONDANSETRON 4 MG/1
4 TABLET, ORALLY DISINTEGRATING ORAL EVERY 8 HOURS PRN
Status: DISCONTINUED | OUTPATIENT
Start: 2024-07-02 | End: 2024-07-04 | Stop reason: HOSPADM

## 2024-07-02 RX ORDER — DIPHENHYDRAMINE HCL 25 MG
25 TABLET ORAL EVERY 6 HOURS PRN
Status: DISCONTINUED | OUTPATIENT
Start: 2024-07-02 | End: 2024-07-04 | Stop reason: HOSPADM

## 2024-07-02 RX ORDER — EPHEDRINE SULFATE 50 MG/ML
10 INJECTION INTRAVENOUS ONCE
Status: DISCONTINUED | OUTPATIENT
Start: 2024-07-02 | End: 2024-07-03

## 2024-07-02 RX ORDER — SODIUM CHLORIDE 9 MG/ML
INJECTION, SOLUTION INTRAVENOUS CONTINUOUS
Status: DISCONTINUED | OUTPATIENT
Start: 2024-07-02 | End: 2024-07-02

## 2024-07-02 RX ORDER — ONDANSETRON 4 MG/1
4 TABLET, FILM COATED ORAL DAILY PRN
Qty: 14 TABLET | Refills: 0 | Status: SHIPPED | OUTPATIENT
Start: 2024-07-02

## 2024-07-02 RX ORDER — METRONIDAZOLE 500 MG/1
500 TABLET ORAL EVERY 8 HOURS SCHEDULED
Status: DISPENSED | OUTPATIENT
Start: 2024-07-02 | End: 2024-07-04

## 2024-07-02 RX ORDER — SODIUM CHLORIDE 0.9 % (FLUSH) 0.9 %
5-40 SYRINGE (ML) INJECTION PRN
Status: DISCONTINUED | OUTPATIENT
Start: 2024-07-02 | End: 2024-07-04 | Stop reason: HOSPADM

## 2024-07-02 RX ORDER — VITAMIN A, ASCORBIC ACID, CHOLECALCIFEROL, .ALPHA.-TOCOPHEROL ACETATE, DL-, THIAMINE MONONITRATE, RIBOFLAVIN, NIACINAMIDE, PYRIDOXINE HYDROCHLORIDE, FOLIC ACID, CYANOCOBALAMIN, CALCIUM CARBONATE, IRON, ZINC OXIDE, AND CUPRIC OXIDE 4000; 120; 400; 22; 1.84; 3; 20; 10; 1; 12; 200; 29; 25; 2 [IU]/1; MG/1; [IU]/1; [IU]/1; MG/1; MG/1; MG/1; MG/1; MG/1; UG/1; MG/1; MG/1; MG/1; MG/1
1 TABLET ORAL DAILY
Status: DISCONTINUED | OUTPATIENT
Start: 2024-07-02 | End: 2024-07-04 | Stop reason: HOSPADM

## 2024-07-02 RX ADMIN — CEPHALEXIN 500 MG: 500 CAPSULE ORAL at 20:21

## 2024-07-02 RX ADMIN — MORPHINE SULFATE 0.2 MG: 1 INJECTION, SOLUTION EPIDURAL; INTRATHECAL; INTRAVENOUS at 12:22

## 2024-07-02 RX ADMIN — Medication 909 ML/HR: at 12:48

## 2024-07-02 RX ADMIN — DIPHENHYDRAMINE HYDROCHLORIDE 25 MG: 25 TABLET ORAL at 17:30

## 2024-07-02 RX ADMIN — SODIUM CHLORIDE: 9 INJECTION, SOLUTION INTRAVENOUS at 11:34

## 2024-07-02 RX ADMIN — SODIUM CHLORIDE, PRESERVATIVE FREE 20 MG: 5 INJECTION INTRAVENOUS at 11:37

## 2024-07-02 RX ADMIN — ACETAMINOPHEN 1000 MG: 500 TABLET ORAL at 17:30

## 2024-07-02 RX ADMIN — PHENYLEPHRINE HYDROCHLORIDE 25 MCG/MIN: 10 INJECTION INTRAVENOUS at 12:32

## 2024-07-02 RX ADMIN — KETOROLAC TROMETHAMINE 30 MG: 30 INJECTION, SOLUTION INTRAMUSCULAR; INTRAVENOUS at 14:08

## 2024-07-02 RX ADMIN — PHENYLEPHRINE HYDROCHLORIDE 100 MCG: 10 INJECTION INTRAVENOUS at 12:30

## 2024-07-02 RX ADMIN — TRANEXAMIC ACID 1000 MG: 10 INJECTION, SOLUTION INTRAVENOUS at 11:41

## 2024-07-02 RX ADMIN — ACETAMINOPHEN 1000 MG: 500 TABLET ORAL at 11:36

## 2024-07-02 RX ADMIN — SODIUM CHLORIDE: 9 INJECTION, SOLUTION INTRAVENOUS at 12:05

## 2024-07-02 RX ADMIN — SODIUM CHLORIDE, POTASSIUM CHLORIDE, SODIUM LACTATE AND CALCIUM CHLORIDE: 600; 310; 30; 20 INJECTION, SOLUTION INTRAVENOUS at 20:50

## 2024-07-02 RX ADMIN — METRONIDAZOLE 500 MG: 500 TABLET ORAL at 20:21

## 2024-07-02 RX ADMIN — SODIUM CITRATE AND CITRIC ACID MONOHYDRATE 30 ML: 500; 334 SOLUTION ORAL at 11:35

## 2024-07-02 RX ADMIN — SODIUM CHLORIDE 1000 ML: 9 INJECTION, SOLUTION INTRAVENOUS at 10:44

## 2024-07-02 RX ADMIN — Medication 2000 MG: at 12:00

## 2024-07-02 RX ADMIN — ONDANSETRON 4 MG: 2 INJECTION INTRAMUSCULAR; INTRAVENOUS at 12:33

## 2024-07-02 RX ADMIN — BUPIVACAINE HYDROCHLORIDE 2 ML: 7.5 INJECTION, SOLUTION INTRASPINAL at 12:22

## 2024-07-02 RX ADMIN — SODIUM CHLORIDE, POTASSIUM CHLORIDE, SODIUM LACTATE AND CALCIUM CHLORIDE: 600; 310; 30; 20 INJECTION, SOLUTION INTRAVENOUS at 14:12

## 2024-07-02 RX ADMIN — KETOROLAC TROMETHAMINE 30 MG: 30 INJECTION, SOLUTION INTRAMUSCULAR; INTRAVENOUS at 20:21

## 2024-07-02 ASSESSMENT — PAIN DESCRIPTION - LOCATION: LOCATION: ABDOMEN

## 2024-07-02 ASSESSMENT — PAIN - FUNCTIONAL ASSESSMENT: PAIN_FUNCTIONAL_ASSESSMENT: ACTIVITIES ARE NOT PREVENTED

## 2024-07-02 ASSESSMENT — PAIN SCALES - GENERAL
PAINLEVEL_OUTOF10: 7
PAINLEVEL_OUTOF10: 0
PAINLEVEL_OUTOF10: 0

## 2024-07-02 ASSESSMENT — PAIN DESCRIPTION - DESCRIPTORS: DESCRIPTORS: CRAMPING

## 2024-07-02 NOTE — FLOWSHEET NOTE
Pt. Presented to L&D for scheduled . Pt. Has Negative SROM, Negative Bloody-show, Negative CTX, and positive FM.     Pt. Last ate at 24  Pt. Last drank 0924  Pt. Showered at the 24 and 24 with CHG soap and upon arrival washed abdomen with CHG .

## 2024-07-02 NOTE — PLAN OF CARE
Problem: Pain  Goal: Verbalizes/displays adequate comfort level or baseline comfort level  Outcome: Progressing     Problem: Chronic Conditions and Co-morbidities  Goal: Patient's chronic conditions and co-morbidity symptoms are monitored and maintained or improved  Outcome: Progressing     Problem: Postpartum  Goal: Experiences normal postpartum course  Outcome: Progressing  Goal: Appropriate maternal -  bonding  Outcome: Progressing  Goal: Establishment of infant feeding pattern  Outcome: Progressing  Goal: Incisions, wounds, or drain sites healing without S/S of infection  Outcome: Progressing     Problem: Infection - Adult  Goal: Absence of infection at discharge  Outcome: Progressing  Goal: Absence of infection during hospitalization  Outcome: Progressing  Goal: Absence of fever/infection during anticipated neutropenic period  Outcome: Progressing     Problem: Safety - Adult  Goal: Free from fall injury  Outcome: Progressing     Problem: Discharge Planning  Goal: Discharge to home or other facility with appropriate resources  Outcome: Progressing

## 2024-07-02 NOTE — CARE COORDINATION
ANTEPARTUM NOTE    Delivery by elective  section [O82]  39 weeks gestation of pregnancy [Z3A.39]    Zulma was admitted to L&D on 24 for scheduled repeat CS @ 39w2d.    OB GYN Provider: Dr. Coronel     Will meet with patient after delivery to verify name/address/phone/insurance and discuss discharge planning.     Anticipate DC home 2 nights after vaginal delivery or 4 nights after C/S delivery as long as hemodynamically stable.

## 2024-07-02 NOTE — BRIEF OP NOTE
Department of Obstetrics and Gynecology  Obstetrical Brief Operative Report  [facility]    Patient: Zulma Clark   : 1990  MRN: [id number]       Acct: [id number]   Date of Procedure: 24    Pre-operative Diagnosis: 34 y.o. female  at 39w2d   Scheduled Repeat   History of  x1, declining trial of labor after  section  Pregestational Diabetes Mellitus  No Measurable Lower Uterine Segment  Thrombocytopenia  Polyhydramnios  History of GDMA2  History of macrosomia  BMI 38     Post-operative Diagnosis: [place] living infant   Scheduled Repeat   History of  x1, declining trial of labor after  section  Pregestational Diabetes Mellitus  No Measurable Lower Uterine Segment  Thrombocytopenia  Polyhydramnios  History of GDMA2  History of macrosomia  BMI 38     Procedure: repeat low transverse  section    Surgeon: Dr. Coronel  Assistant(s): Criselda Ortiz MD, PGY3; Vishal Howell MD, PGY2    Anesthesia: spinal with Duramorph    Information for the patient's :  Hill Clark [[id number]]   male   Birth Weight: 4.115 kg (9 lb 1.2 oz)   Information for the patient's :  Hill Clark [[id number]]        Findings:  Live Born 9 lb 1 oz male infant in cephalic presentation with Apgars of 9 at 1 minute and 9 at five minutes, normal appearing uterus tubes and ovaries   Quantitative Blood Loss: pending immediately post-operatively  Total IV Fluids: 1000ml  Urine output: 400ml clear urine   Drains:  little catheter  Specimens:  cord blood, and cord gases  Instrument and Sponge Count: Correct  Complications: none  Condition: Infant stable, transfer to General Care Nursery, Mother stable, transfer to post anesthesia recovery      Vishal Howell MD  Ob/Gyn Resident  2024, 1:36 PM          Attending Physician Statement      I have personally seen, evaluated and discussed the care of Zulma Clark, including

## 2024-07-02 NOTE — ANESTHESIA PRE PROCEDURE
04/16/2024 11:12 PM    HGB 11.6 04/16/2024 11:12 PM    HCT 35.3 04/16/2024 11:12 PM    MCV 90.1 04/16/2024 11:12 PM    RDW 14.4 04/16/2024 11:12 PM     04/16/2024 11:12 PM       CMP: No results found for: \"NA\", \"K\", \"CL\", \"CO2\", \"BUN\", \"CREATININE\", \"GFRAA\", \"AGRATIO\", \"LABGLOM\", \"GLUCOSE\", \"GLU\", \"CALCIUM\", \"BILITOT\", \"ALKPHOS\", \"AST\", \"ALT\"    POC Tests: No results for input(s): \"POCGLU\", \"POCNA\", \"POCK\", \"POCCL\", \"POCBUN\", \"POCHEMO\", \"POCHCT\" in the last 72 hours.    Coags: No results found for: \"PROTIME\", \"INR\", \"APTT\"    HCG (If Applicable): No results found for: \"PREGTESTUR\", \"PREGSERUM\", \"HCG\", \"HCGQUANT\"     ABGs: No results found for: \"PHART\", \"PO2ART\", \"CEA8XPQ\", \"MFN1LXS\", \"BEART\", \"P3IBOXRM\"     Type & Screen (If Applicable):  No results found for: \"LABABO\"    Drug/Infectious Status (If Applicable):  Lab Results   Component Value Date/Time    HEPCAB NONREACTIVE 12/14/2023 11:23 AM       COVID-19 Screening (If Applicable): No results found for: \"COVID19\"        Anesthesia Evaluation  Patient summary reviewed and Nursing notes reviewed  Airway: Mallampati: II  TM distance: >3 FB   Neck ROM: full  Mouth opening: > = 3 FB   Dental: normal exam         Pulmonary:Negative Pulmonary ROS breath sounds clear to auscultation      (-) asthma                           Cardiovascular:Negative CV ROS            Rhythm: regular  Rate: normal                    Neuro/Psych:   (+) headaches:            GI/Hepatic/Renal: Neg GI/Hepatic/Renal ROS       (-) GERD       Endo/Other: Negative Endo/Other ROS                    Abdominal:             Vascular:          Other Findings:             Anesthesia Plan      spinal     ASA 3     (Spinal with back up of GETA)        Anesthetic plan and risks discussed with patient.      Plan discussed with CRNA.                    Alberto Vines MD   7/2/2024

## 2024-07-02 NOTE — ANESTHESIA PROCEDURE NOTES
Spinal Block    Patient location during procedure: OR  End time: 7/2/2024 12:20 PM  Reason for block: primary anesthetic  Staffing  Performed: resident/CRNA   Resident/CRNA: Man Alonso APRN - CRNA  Performed by: Man Alonso APRN - CRNA  Authorized by: Alberto Vines MD    Spinal Block  Patient position: sitting  Prep: Betadine  Patient monitoring: cardiac monitor, continuous pulse ox and frequent blood pressure checks  Location: L4/L5  Provider prep: mask and sterile gloves  Local infiltration: lidocaine  Needle  Needle type: Pencan   Needle gauge: 24 G  Needle length: 3.5 in  Assessment  Sensory level: T6  Swirl obtained: Yes  CSF: clear  Attempts: 1  Hemodynamics: stable  Preanesthetic Checklist  Completed: patient identified, IV checked, risks and benefits discussed, surgical/procedural consents, equipment checked, pre-op evaluation, timeout performed, anesthesia consent given, oxygen available and monitors applied/VS acknowledged

## 2024-07-02 NOTE — FLOWSHEET NOTE
Received into room 737 per bed from L&D accompanied by nurse and spouse. Oriented to room and call light with understanding voiced.

## 2024-07-02 NOTE — OP NOTE
Operative Note  Department of Obstetrics and Gynecology  [facility]     Patient: Zulma Clark   : 1990  MRN: [id number]       Acct: [id number]   PCP: Naeem Kessler MD  Date of Procedure: 24    Pre-operative Diagnosis: 34 y.o. female  at 39w2d   Scheduled Repeat   History of  x1, declining trial of labor after  section  Pregestational Diabetes Mellitus  No Measurable Lower Uterine Segment  Thrombocytopenia  Polyhydramnios  History of GDMA2  History of macrosomia  BMI 38     Post-operative Diagnosis: Simpson living infant Male  Scheduled Repeat   History of  x1, declining trial of labor after  section  Pregestational Diabetes Mellitus  No Measurable Lower Uterine Segment  Thrombocytopenia  Polyhydramnios  History of GDMA2  History of macrosomia  BMI 38     Procedure: repeat low transverse  section    Indications: Zulma Clark is a 34 y.o.  at 39w2d who presents for scheduled repeat  with history of  x1 and declining trial of labor after  section. She received Tylenol, Bicitra, Pepcid, 2g Ancef, 1g TXA, and a scopolamine patch. Risks, benefits, alternatives of  are discussed and patient is consented.      Surgeon: Dr. Andres Coronel  Assistants:  Criselda Marques MD, PGY3; Vishal Howell MD, PGY2    Anesthesia: spinal with duramorph    Procedure Details   The patient was seen pre-operatively. The risks, benefits, complications, treatment options, and expected outcomes were discussed with the patient. The patient concurred with the proposed plan, giving informed consent. The patient was taken to the Operating Room, identified as Zulma Clark and the procedure verified as  Delivery. A Time Out was held and the above information confirmed.     After spinal anesthesia, the patient was draped and prepped in the usual sterile manner. A Pfannenstiel incision was

## 2024-07-03 PROBLEM — O24.319 PREGESTATIONAL DIABETES MELLITUS, MODIFIED WHITE CLASS B: Status: ACTIVE | Noted: 2024-07-03

## 2024-07-03 PROBLEM — Z98.891 STATUS POST CESAREAN SECTION: Status: ACTIVE | Noted: 2024-07-03

## 2024-07-03 PROBLEM — O40.9XX0 POLYHYDRAMNIOS AFFECTING PREGNANCY: Status: RESOLVED | Noted: 2024-06-11 | Resolved: 2024-07-03

## 2024-07-03 PROBLEM — Z3A.39 39 WEEKS GESTATION OF PREGNANCY: Status: RESOLVED | Noted: 2024-07-02 | Resolved: 2024-07-03

## 2024-07-03 PROBLEM — R93.89 ABNORMAL ULTRASOUND: Status: RESOLVED | Noted: 2024-02-27 | Resolved: 2024-07-03

## 2024-07-03 LAB
ERYTHROCYTE [DISTWIDTH] IN BLOOD BY AUTOMATED COUNT: 14.3 % (ref 11.8–14.4)
HCT VFR BLD AUTO: 35.8 % (ref 36.3–47.1)
HGB BLD-MCNC: 11.5 G/DL (ref 11.9–15.1)
MCH RBC QN AUTO: 29.3 PG (ref 25.2–33.5)
MCHC RBC AUTO-ENTMCNC: 32.1 G/DL (ref 28.4–34.8)
MCV RBC AUTO: 91.3 FL (ref 82.6–102.9)
NRBC BLD-RTO: 0 PER 100 WBC
PLATELET # BLD AUTO: 153 K/UL (ref 138–453)
PMV BLD AUTO: 10.3 FL (ref 8.1–13.5)
RBC # BLD AUTO: 3.92 M/UL (ref 3.95–5.11)
WBC OTHER # BLD: 12.3 K/UL (ref 3.5–11.3)

## 2024-07-03 PROCEDURE — 6360000002 HC RX W HCPCS

## 2024-07-03 PROCEDURE — 36415 COLL VENOUS BLD VENIPUNCTURE: CPT

## 2024-07-03 PROCEDURE — 85027 COMPLETE CBC AUTOMATED: CPT

## 2024-07-03 PROCEDURE — 94761 N-INVAS EAR/PLS OXIMETRY MLT: CPT

## 2024-07-03 PROCEDURE — 1220000000 HC SEMI PRIVATE OB R&B

## 2024-07-03 PROCEDURE — 99024 POSTOP FOLLOW-UP VISIT: CPT | Performed by: STUDENT IN AN ORGANIZED HEALTH CARE EDUCATION/TRAINING PROGRAM

## 2024-07-03 PROCEDURE — 2580000003 HC RX 258

## 2024-07-03 PROCEDURE — 6370000000 HC RX 637 (ALT 250 FOR IP)

## 2024-07-03 RX ORDER — IBUPROFEN 600 MG/1
600 TABLET ORAL EVERY 6 HOURS
Status: DISCONTINUED | OUTPATIENT
Start: 2024-07-03 | End: 2024-07-03 | Stop reason: SDUPTHER

## 2024-07-03 RX ORDER — IBUPROFEN 600 MG/1
600 TABLET ORAL EVERY 6 HOURS
Status: DISCONTINUED | OUTPATIENT
Start: 2024-07-03 | End: 2024-07-04 | Stop reason: HOSPADM

## 2024-07-03 RX ADMIN — METRONIDAZOLE 500 MG: 500 TABLET ORAL at 17:28

## 2024-07-03 RX ADMIN — CEPHALEXIN 500 MG: 500 CAPSULE ORAL at 17:29

## 2024-07-03 RX ADMIN — SENNOSIDES AND DOCUSATE SODIUM 1 TABLET: 8.6; 5 TABLET ORAL at 23:36

## 2024-07-03 RX ADMIN — IBUPROFEN 600 MG: 600 TABLET, FILM COATED ORAL at 17:28

## 2024-07-03 RX ADMIN — METRONIDAZOLE 500 MG: 500 TABLET ORAL at 05:34

## 2024-07-03 RX ADMIN — ACETAMINOPHEN 1000 MG: 500 TABLET ORAL at 17:28

## 2024-07-03 RX ADMIN — CEPHALEXIN 500 MG: 500 CAPSULE ORAL at 05:34

## 2024-07-03 RX ADMIN — CEPHALEXIN 500 MG: 500 CAPSULE ORAL at 23:31

## 2024-07-03 RX ADMIN — ACETAMINOPHEN 1000 MG: 500 TABLET ORAL at 11:38

## 2024-07-03 RX ADMIN — ENOXAPARIN SODIUM 40 MG: 100 INJECTION SUBCUTANEOUS at 11:38

## 2024-07-03 RX ADMIN — ACETAMINOPHEN 1000 MG: 500 TABLET ORAL at 23:28

## 2024-07-03 RX ADMIN — SODIUM CHLORIDE, PRESERVATIVE FREE 10 ML: 5 INJECTION INTRAVENOUS at 11:40

## 2024-07-03 RX ADMIN — ACETAMINOPHEN 1000 MG: 500 TABLET ORAL at 05:33

## 2024-07-03 RX ADMIN — SENNOSIDES AND DOCUSATE SODIUM 1 TABLET: 8.6; 5 TABLET ORAL at 11:38

## 2024-07-03 RX ADMIN — ACETAMINOPHEN 1000 MG: 500 TABLET ORAL at 00:30

## 2024-07-03 RX ADMIN — SODIUM CHLORIDE, PRESERVATIVE FREE 10 ML: 5 INJECTION INTRAVENOUS at 23:31

## 2024-07-03 RX ADMIN — IBUPROFEN 600 MG: 600 TABLET, FILM COATED ORAL at 23:31

## 2024-07-03 RX ADMIN — METRONIDAZOLE 500 MG: 500 TABLET ORAL at 23:31

## 2024-07-03 RX ADMIN — IBUPROFEN 600 MG: 600 TABLET, FILM COATED ORAL at 11:38

## 2024-07-03 RX ADMIN — Medication 1 TABLET: at 11:38

## 2024-07-03 RX ADMIN — KETOROLAC TROMETHAMINE 30 MG: 30 INJECTION, SOLUTION INTRAMUSCULAR; INTRAVENOUS at 05:35

## 2024-07-03 ASSESSMENT — PAIN SCALES - GENERAL
PAINLEVEL_OUTOF10: 3
PAINLEVEL_OUTOF10: 0
PAINLEVEL_OUTOF10: 0
PAINLEVEL_OUTOF10: 2
PAINLEVEL_OUTOF10: 0

## 2024-07-03 ASSESSMENT — PAIN - FUNCTIONAL ASSESSMENT
PAIN_FUNCTIONAL_ASSESSMENT: ACTIVITIES ARE NOT PREVENTED

## 2024-07-03 ASSESSMENT — PAIN DESCRIPTION - ORIENTATION: ORIENTATION: MID;LOWER

## 2024-07-03 ASSESSMENT — PAIN DESCRIPTION - LOCATION: LOCATION: ABDOMEN

## 2024-07-03 ASSESSMENT — PAIN DESCRIPTION - DESCRIPTORS: DESCRIPTORS: CRAMPING

## 2024-07-03 NOTE — CARE COORDINATION
Social Work     Sw reviewed medical record (current active problem list) and tox screens and found no current concerns.     Sw spoke with mom briefly to explain Sw role, inquire if any needs or concerns, and provide safe sleep education and discuss.  Mom denied any needs or questions and informs baby has a safe sleep environment (bassinet and crib).     Mom denied any current s/s of anxiety or depression and is aware to reach out to OB if any s/s occur after dc.     Mom reports a good support system and denied any current questions or needs.      Mom reports this is her 2nd child, she also has a 2 year old son.       Mom states ped will be Dr. Souza.      Sw encouraged mom to reach out if any issues or concerns arise.

## 2024-07-03 NOTE — PROGRESS NOTES
POST OPERATIVE DAY # 1    Zulma Clark is a 34 y.o. female   This patient was seen and examined today.     Her pregnancy was complicated by:   Patient Active Problem List   Diagnosis    Acne    Hx GDMA2    Hx C/S x1    High risk pregnancy, antepartum    Hx Macrosomia    Pregestational DM    Status post repeat low transverse  section    RLTCS 24 M Apg 8/9 Wt 9#1       Today she is doing well without any chief complaint. Her lochia is light. She denies chest pain, shortness of breath, headache, lightheadedness, palpitations, and fatigue. She is  breast and bottle feeding and she denies any signs or symptoms of mastitis.  She is ambulating well. She is voiding without difficulty. She currently denies S/S of postpartum depression. Flatus present.  Bowel movement absent. She is tolerating solids.    Vital Signs:  Vitals:    24 1555 24 2030 24 0100 24 0400   BP: 111/73 (!) 100/58 122/73 124/72   Pulse: 82 78 92 88   Resp: 16 18 18 18   Temp: 98.6 °F (37 °C) 98.4 °F (36.9 °C) 98 °F (36.7 °C) 98.2 °F (36.8 °C)   TempSrc: Oral Oral Oral Oral   SpO2: 97% 98% 99% 99%   Weight:       Height:             Urine Input & Output last 24hrs:     Intake/Output Summary (Last 24 hours) at 7/3/2024 0548  Last data filed at 7/3/2024 0100  Gross per 24 hour   Intake 2287.69 ml   Output 1640 ml   Net 647.69 ml       Physical Exam:  General:  no apparent distress, alert, and cooperative  Neurologic:  alert, oriented, normal speech, no focal findings or movement disorder noted  Lungs:  No increased work of breathing noted  Heart:  regular rate and rhythm    Abdomen: abdomen soft, non-distended, non-tender  Fundus: non-tender, normal size, firm, below umbilicus  Incision: Prevena dressing in place, functioning appropriately   Extremities:  no calf tenderness, non edematous    Labs:  Lab Results   Component Value Date    WBC 11.7 (H) 2024    HGB 12.6 2024    HCT 36.2 (L) 2024    MCV 85.4  2024     2024       Assessment/Plan:  Zulma Clark is a  POD # 1 s/p RLTCS   - Doing well, VSS    - male infant in General Care Nursery, circumcision desired   - Encourage ambulation and use of incentive spirometer   - D/C little catheter and saline lock IV on POD #1    - CBC awaiting   - Vivian/Motrin/Tylenol for pain control    Rh positive/Rubella immune   - RhoGAM/MMR not indicated    Pregestational Diabetes (patient preferred diagnosis)   - Patient elected to be treated as pregestational diabetic in pregnancy due to history of gestational diabetes in previous pregnancy.   - HbA1C 2024 4.8%   - Recommend follow up with PCP and primary OB PP     Breast and bottle feeding   - Denies any s/s of mastitis    BMI 38   - Keflex/Flagyl x 48 hrs    - Lovenox 40 mg qd   - Prevena in place    Continue post-op care.    Counseling Completed:  Secondary Smoke risks and Sudden Infant Death Syndrome were reviewed with recommendations. Infant sleeping, \"back to sleep\" and avoidance of co-sleeping recommendations were reviewed.  Signs and Symptoms of Post Partum Depression were reviewed. The patient is to call if any occur.  Signs and symptoms of Mastitis were reviewed. The patient is to call if any occur for follow up.  Discharge instructions including pelvic rest, incision care, 15 lb weight restriction, no driving with pain medicine and office follow-up were reviewed with patient     Attending Physician: Dr. Jenny Han MD  Ob/Gyn Resident  7/3/2024, 5:48 AM    Resident Physician Statement  I have discussed the case, including pertinent history and exam findings with the above. I have personally seen the patient. I agree with the assessment, plan and orders as documented. I have made changes to the above note as needed. I have discussed the case with above named attending.     Patient is in agreement with plan. All questions answered.    Alina Lopez MD  OB/GYN

## 2024-07-03 NOTE — PLAN OF CARE
Problem: Pain  Goal: Verbalizes/displays adequate comfort level or baseline comfort level  7/3/2024 06 by Yancy Cameron RN  Outcome: Progressing  2024 by Rona Cohen RN  Outcome: Progressing     Problem: Chronic Conditions and Co-morbidities  Goal: Patient's chronic conditions and co-morbidity symptoms are monitored and maintained or improved  7/3/2024 06 by Yancy Cameron RN  Outcome: Progressing  2024 by Rona Cohen RN  Outcome: Progressing     Problem: Postpartum  Goal: Experiences normal postpartum course  Description:  Postpartum OB-Pregnancy care plan goal which identifies if the mother is experiencing a normal postpartum course  7/3/2024 06 by Yancy Cameron RN  Outcome: Progressing  2024 by Rona Cohen RN  Outcome: Progressing  Goal: Appropriate maternal -  bonding  Description:  Postpartum OB-Pregnancy care plan goal which identifies if the mother and  are bonding appropriately  7/3/2024 06 by Yancy Cameron RN  Outcome: Progressing  2024 by Rona Cohen RN  Outcome: Progressing  Goal: Establishment of infant feeding pattern  Description:  Postpartum OB-Pregnancy care plan goal which identifies if the mother is establishing a feeding pattern with their   7/3/2024 06 by Yancy Cameron RN  Outcome: Progressing  2024 by Rona Cohen RN  Outcome: Progressing  Goal: Incisions, wounds, or drain sites healing without S/S of infection  7/3/2024 06 by Yancy Cameron RN  Outcome: Progressing  2024 by Rona Cohen RN  Outcome: Progressing     Problem: Infection - Adult  Goal: Absence of infection at discharge  2024 by Rona Cohen RN  Outcome: Progressing  Goal: Absence of infection during hospitalization  2024 by Rona Cohen RN  Outcome: Progressing  Goal: Absence of fever/infection during anticipated neutropenic period  7/3/2024 06 by Yancy Cameron

## 2024-07-03 NOTE — CONSULTS
Breastfeeding packet given and reviewed, pt states nursing is going well. Says her last child nursed for a couple of months and switched to exclusively pumping due to tongue and lip tie. Reviewed signs of milk transfer, knows to correct if shallow. Has pump from previous pregnancy and ordered a new one as well. Encouraged to call out for assistance as needed.

## 2024-07-03 NOTE — PROGRESS NOTES
CLINICAL PHARMACY NOTE: MEDS TO BEDS    Total # of Prescriptions Filled: 5   The following medications were delivered to the patient:  Gas relief  Acetaminophen ES  Oxycodone  Ondansetron  ibuprofen    Additional Documentation: cloconcepción [email or web address]

## 2024-07-03 NOTE — CARE COORDINATION
CASE MANAGEMENT POST-PARTUM TRANSITIONAL CARE PLAN    Delivery by elective  section [O82]  39 weeks gestation of pregnancy [Z3A.39]    OB Provider: Dr. Coronel    Writer met w/ Zulma  at her bedside to discuss DCP. She is S/P   on 24    Writer verified address/phone number correct on facesheet. She states she lives with / Pillo & 1 son. Zulma  denied barriers with transportation home, to doctors appointments or with paying for medications upon discharge home.     [employer]  insurance correct. Writer notified Zulma she has  30 days from date of birth to add  to insurance policy. She  verbalized understanding.    Infant name on BC: Jakob Clark      Infant PCP Dr. Souza.     DME: Glucometer      HOME CARE: none    Anticipate discharge home of couplet    Case management will continue to follow for discharge needs      Readmission Risk              Risk of Unplanned Readmission:  5

## 2024-07-04 VITALS
TEMPERATURE: 97.7 F | WEIGHT: 225 LBS | BODY MASS INDEX: 38.41 KG/M2 | SYSTOLIC BLOOD PRESSURE: 123 MMHG | HEIGHT: 64 IN | RESPIRATION RATE: 18 BRPM | OXYGEN SATURATION: 99 % | HEART RATE: 81 BPM | DIASTOLIC BLOOD PRESSURE: 89 MMHG

## 2024-07-04 PROBLEM — N85.A UTERINE SCAR FROM PREVIOUS CESAREAN DELIVERY: Status: ACTIVE | Noted: 2024-07-04

## 2024-07-04 PROCEDURE — 6360000002 HC RX W HCPCS

## 2024-07-04 PROCEDURE — 99024 POSTOP FOLLOW-UP VISIT: CPT | Performed by: OBSTETRICS & GYNECOLOGY

## 2024-07-04 PROCEDURE — 6370000000 HC RX 637 (ALT 250 FOR IP)

## 2024-07-04 RX ADMIN — ACETAMINOPHEN 1000 MG: 500 TABLET ORAL at 06:08

## 2024-07-04 RX ADMIN — IBUPROFEN 600 MG: 600 TABLET, FILM COATED ORAL at 06:07

## 2024-07-04 RX ADMIN — IBUPROFEN 600 MG: 600 TABLET, FILM COATED ORAL at 12:32

## 2024-07-04 RX ADMIN — ACETAMINOPHEN 1000 MG: 500 TABLET ORAL at 12:33

## 2024-07-04 RX ADMIN — ENOXAPARIN SODIUM 40 MG: 100 INJECTION SUBCUTANEOUS at 09:35

## 2024-07-04 RX ADMIN — SENNOSIDES AND DOCUSATE SODIUM 1 TABLET: 8.6; 5 TABLET ORAL at 09:35

## 2024-07-04 RX ADMIN — METRONIDAZOLE 500 MG: 500 TABLET ORAL at 06:07

## 2024-07-04 RX ADMIN — CEPHALEXIN 500 MG: 500 CAPSULE ORAL at 06:08

## 2024-07-04 ASSESSMENT — PAIN - FUNCTIONAL ASSESSMENT: PAIN_FUNCTIONAL_ASSESSMENT: ACTIVITIES ARE NOT PREVENTED

## 2024-07-04 ASSESSMENT — PAIN SCALES - GENERAL
PAINLEVEL_OUTOF10: 1
PAINLEVEL_OUTOF10: 4

## 2024-07-04 ASSESSMENT — PAIN DESCRIPTION - LOCATION: LOCATION: ABDOMEN

## 2024-07-04 ASSESSMENT — PAIN DESCRIPTION - DESCRIPTORS: DESCRIPTORS: CRAMPING

## 2024-07-04 NOTE — FLOWSHEET NOTE
Patient discharged to home. Instructions given to patient. Patient verbalizes understanding. Meds to beds delivered all medications. Supplies given to patient. Patient to main lobby with all belongings and FOB. Infant in car seat carried by FOB.

## 2024-07-04 NOTE — PLAN OF CARE
Problem: Pain  Goal: Verbalizes/displays adequate comfort level or baseline comfort level  Outcome: Adequate for Discharge     Problem: Chronic Conditions and Co-morbidities  Goal: Patient's chronic conditions and co-morbidity symptoms are monitored and maintained or improved  Outcome: Progressing     Problem: Postpartum  Goal: Experiences normal postpartum course  Description:  Postpartum OB-Pregnancy care plan goal which identifies if the mother is experiencing a normal postpartum course  Outcome: Adequate for Discharge  Goal: Appropriate maternal -  bonding  Description:  Postpartum OB-Pregnancy care plan goal which identifies if the mother and  are bonding appropriately  Outcome: Adequate for Discharge  Goal: Establishment of infant feeding pattern  Description:  Postpartum OB-Pregnancy care plan goal which identifies if the mother is establishing a feeding pattern with their   Outcome: Progressing  Goal: Incisions, wounds, or drain sites healing without S/S of infection  Outcome: Progressing     Problem: Infection - Adult  Goal: Absence of infection at discharge  Outcome: Progressing  Goal: Absence of infection during hospitalization  Outcome: Progressing  Goal: Absence of fever/infection during anticipated neutropenic period  Outcome: Progressing     Problem: Safety - Adult  Goal: Free from fall injury  Outcome: Adequate for Discharge     Problem: Discharge Planning  Goal: Discharge to home or other facility with appropriate resources  Outcome: Progressing

## 2024-07-04 NOTE — PROGRESS NOTES
POST OPERATIVE DAY # 2    Zulma Clark is a 34 y.o. female   This patient was seen and examined today.     Her pregnancy was complicated by:   Patient Active Problem List   Diagnosis    Acne    Hx GDMA2    Hx C/S x1    High risk pregnancy, antepartum    Hx Macrosomia    Pregestational DM    Status post repeat low transverse  section    RLTCS 24 M Apg  Wt 9#1    Pregestational diabetes mellitus, modified White class B    Status post  section     Today she is doing well without any chief complaint. Her lochia is moderate. She denies chest pain, shortness of breath, headache, lightheadedness, and blurred vision. She is  breast feeding and she denies any signs or symptoms of mastitis.  She is ambulating well. She is voiding without difficulty. She currently denies S/S of postpartum depression. Flatus present.  Bowel movement absent. She is tolerating solids.    Vital Signs:  Vitals:    24 0400 24 0815 24 1630 24   BP: 124/72 114/73 125/81 118/73   Pulse: 88 86 90 89   Resp: 18 18 16 16   Temp: 98.2 °F (36.8 °C) 97.9 °F (36.6 °C) 98.4 °F (36.9 °C) 98.3 °F (36.8 °C)   TempSrc: Oral Oral Oral Oral   SpO2: 99% 99%  99%   Weight:       Height:         Urine Input & Output last 24hrs:     Intake/Output Summary (Last 24 hours) at 2024 0425  Last data filed at 7/3/2024 0533  Gross per 24 hour   Intake 360 ml   Output --   Net 360 ml     Physical Exam:  General:  no apparent distress, alert, and cooperative  Neurologic:  alert, oriented, normal speech, no focal findings or movement disorder noted  Lungs:  No increased work of breathing, good air exchange, clear to auscultation bilaterally, no crackles or wheezing  Heart:  regular rate and rhythm    Abdomen: abdomen soft, non-distended, non-tender  Fundus: non-tender, normal size, firm, below umbilicus  Incision: Prevena dressing in place with good function  Extremities:  no calf tenderness, non edematous    Labs:  Lab  Resident  2024, 4:25 AM    Resident Physician Statement  I have discussed the case, including pertinent history and exam findings with the above. I have personally seen the patient. I agree with the assessment, plan and orders as documented. I have made changes to the above note as needed. I have discussed the case with above named attending.     Patient is in agreement with plan. All questions answered.    Alina Lopez MD  OB/GYN Resident  2024  5:24 AM          Attending Physician Statement  I have discussed the care of Zulma Clark, including pertinent history and exam findings,  with the resident. I have seen and examined the patient and the key elements of all parts of the encounter have been performed by me.  I agree with the assessment, plan and orders as documented by the resident.  (GC Modifier)     Pt seen and examined.  She is very eager to be discharged home.  Denies CP/SOB/F/CH/N/V/HA.  She is ambulating, tolerating PO, voiding without difficulty.  She is breastfeeding.  States bleeding and pain is mild.    Vitals:    24 0800   BP: 123/89   Pulse: 81   Resp: 18   Temp: 97.7 °F (36.5 °C)   SpO2:        Recent Results (from the past 48 hour(s))   CBC    Collection Time: 24  9:56 AM   Result Value Ref Range    WBC 12.3 (H) 3.5 - 11.3 k/uL    RBC 3.92 (L) 3.95 - 5.11 m/uL    Hemoglobin 11.5 (L) 11.9 - 15.1 g/dL    Hematocrit 35.8 (L) 36.3 - 47.1 %    MCV 91.3 82.6 - 102.9 fL    MCH 29.3 25.2 - 33.5 pg    MCHC 32.1 28.4 - 34.8 g/dL    RDW 14.3 11.8 - 14.4 %    Platelets 153 138 - 453 k/uL    MPV 10.3 8.1 - 13.5 fL    NRBC Automated 0.0 0.0 per 100 WBC         POD #2 repeat c/s, male, circ done  Rh+  VSS          Discharge Instructions for  Birth     During a  section (), an incision is made in the abdomen and uterus (womb) to deliver the baby. The normal hospital stay is 2-4 days.   Steps to Take   Home Care    For the first 1-2 weeks, ask for someone to help

## 2024-07-04 NOTE — PROGRESS NOTES
Obstetric/Gynecology Resident Interval Note    Patient seen at bedside for dressing change. Pressure dressing is removed. Incision appears clean/dry/intact. Silver dressing is placed in a sterile fashion. Patient counseled on signs and symptoms of infection at the incision site including malodorous and/or purulent discharge. Patient instructed to notify RN and Resident if concerning symptoms arise. All questions and concerns are addressed.     Donna Jones DO  OB/GYN Resident, PGY1  [place], [place]  7/4/2024, 9:20 AM

## 2024-07-05 ENCOUNTER — TELEPHONE (OUTPATIENT)
Age: 34
End: 2024-07-05

## 2024-07-05 NOTE — PROGRESS NOTES
1 week pp check ,  24  Epds    Pt reports no concerns         2024    11:31 AM   Postpartum Depression Scale   I have been able to laugh and see the funny side of things As much as I always could   I have looked forward with enjoyment to things As much as I ever did   I have blamed myself unnecessarily when things went wrong No, never   I have been anxious or worried for no good reason No, not at all   I have felt scared or panicky for no good reason No, not at all   I haven't been able to cope lately No, most of the time I have coped quite well   I have been so unhappy that I have had difficulty sleeping Not at all   I have felt sad or miserable Not very often   I have been so unhappy that I have been crying No, never   The thought of harming myself has occurred to me Never   Total 2

## 2024-07-05 NOTE — TELEPHONE ENCOUNTER
Care Transitions Initial Follow Up Call    Outreach made within 2 business days of discharge: Yes    Patient: Zulma Clark Patient : 1990   MRN: [id number]  Reason for Admission: There are no discharge diagnoses documented for the most recent discharge.  Discharge Date: 24       Spoke with: patient    Discharge department/facility: [facility] Interactive Patient Contact:  Was patient able to fill all prescriptions: Yes  Was patient instructed to bring all medications to the follow-up visit: Yes  Is patient taking all medications as directed in the discharge summary? Yes  Does patient understand their discharge instructions: Yes  Does patient have questions or concerns that need addressed prior to 7-14 day follow up office visit: no    Patient was there for a birth. Pt doing good.     Follow Up  Future Appointments   Date Time Provider Department Center   2024 11:10 AM Andres Coronel DO [id number]       JOSE ALEJANDRO BARNES MA

## 2024-07-08 LAB — SURGICAL PATHOLOGY REPORT: NORMAL

## 2024-07-09 ENCOUNTER — POSTPARTUM VISIT (OUTPATIENT)
Age: 34
End: 2024-07-09

## 2024-07-09 VITALS
WEIGHT: 210.2 LBS | HEIGHT: 64 IN | HEART RATE: 84 BPM | SYSTOLIC BLOOD PRESSURE: 124 MMHG | DIASTOLIC BLOOD PRESSURE: 82 MMHG | BODY MASS INDEX: 35.89 KG/M2

## 2024-07-09 DIAGNOSIS — O24.319 MATERNAL PREGESTATIONAL DIABETES CLASSES B THROUGH R, ANTEPARTUM: ICD-10-CM

## 2024-07-09 DIAGNOSIS — Z98.891 H/O: CESAREAN SECTION: ICD-10-CM

## 2024-07-09 PROBLEM — N85.A UTERINE SCAR FROM PREVIOUS CESAREAN DELIVERY: Status: RESOLVED | Noted: 2024-07-04 | Resolved: 2024-07-09

## 2024-07-09 PROCEDURE — 0503F POSTPARTUM CARE VISIT: CPT | Performed by: STUDENT IN AN ORGANIZED HEALTH CARE EDUCATION/TRAINING PROGRAM

## 2024-07-09 NOTE — PROGRESS NOTES
Postpartum Visit  [facility]     Zulma Clark is a 34 y.o. female , 1 week Post Partum s/p RLTCS on 24    The patient was seen. She has no chief complaint today.    She is  breast feeding and denies signs or symptoms of mastitis.  The patient completed the E.P.D.S. Post Partum Depression Evaluation form and EPDS Score of 2. She does not have signs or symptoms of post partum depression. She denies any suicidal thoughts with a plan, intent to harm others, and delusional ideas. She does admit to having good home support. Today her lochia is light she denies any dizziness or shortness of breath.  Her bowels are regular and she denies any urinary tract symptomology. She has not been sexually active since surgery.    Her pregnancy was complicated by:  Patient Active Problem List    Diagnosis Date Noted    Encounter for care or examination of mother immediately after delivery 2024    Pregestational diabetes mellitus, modified White class B 2024    Status post repeat low transverse  section 2024    RLTCS 24 M Apg 8/9 Wt 9#1 2024    Pregestational DM 2024     Overview Note:     Declined GTT, elevated BS, desired to be called pregestational DM  Delivery window well controlled [id number]  24 @ MFM: Start NPH 10u qHS    24- stopped insulin, blood sugars remained controlled  Baseline P/C 0.27 24  ASA81  Fetal echo wnl  NST @32wk  24: pt not currently on insulin  24: MFM recommends 6U qHS. Pt reports fastings <90 and not taking. Strongly recommend compliance with fasting and 2hr PP blood sugar checks  24: Pt reports fastings now are >90 so restarted insulin. Checks fasting blood sugars, uses 12U NPH at night. Reports rarely checking postprandial      Hx Macrosomia 2024     Overview Note:     G1, in the presence of uncontrolled GDMA2      High risk pregnancy, antepartum 2023    Hx GDMA2 2023     Overview Note:

## 2024-07-16 ENCOUNTER — POSTPARTUM VISIT (OUTPATIENT)
Age: 34
End: 2024-07-16

## 2024-07-16 VITALS
WEIGHT: 197.6 LBS | SYSTOLIC BLOOD PRESSURE: 122 MMHG | DIASTOLIC BLOOD PRESSURE: 78 MMHG | HEART RATE: 89 BPM | BODY MASS INDEX: 33.92 KG/M2

## 2024-07-16 DIAGNOSIS — Z98.891 H/O: CESAREAN SECTION: ICD-10-CM

## 2024-07-16 DIAGNOSIS — O24.319 MATERNAL PREGESTATIONAL DIABETES CLASSES B THROUGH R, ANTEPARTUM: ICD-10-CM

## 2024-07-16 PROCEDURE — 99024 POSTOP FOLLOW-UP VISIT: CPT | Performed by: STUDENT IN AN ORGANIZED HEALTH CARE EDUCATION/TRAINING PROGRAM

## 2024-07-16 NOTE — PROGRESS NOTES
Patient here for 2wk PP check and c/o bumbs in bilat armpits and bilat labias and are sore to touch. EPDS 6  
Note:     G1, in the presence of uncontrolled GDMA2      High risk pregnancy, antepartum 2023    Hx GDMA2 2023     Overview Note:     Declined oral GTT, requesting to check POCT, referral to MFM placed        Hx C/S x1 2023     Overview Note:     Breech presentation, desires TOLAC if presents in spontaneous labor   calculator 56.3%  Counseling completed 24      Acne 2013       Vitals:   Blood pressure 122/78, pulse 89, weight 89.6 kg (197 lb 9.6 oz), last menstrual period 2023, currently breastfeeding.    Physical Exam:    General:  no apparent distress, alert, and cooperative  Lungs:  No conversational dyspnea  Heart:  regular rate and rhythm  Abdomen: Abdomen soft, non-tender, no rebound, guarding, rigidity, BS normal. no masses  Fundus: non-tender, normal size, firm, below umbilicus  Perineum: not inspected  Incision: clean, dry, and intact with some persistent induration at right side of incision, no erythema, warmth, or drainage  Extremities:  no calf tenderness, non edematous, non erythematous. Axilla with two small 3mm mobile lumps without erythema, warmth, or drainage suspicious for possible small lymph node    Assessment:  Zulma Clark is a 34 y.o. female , 2 weeks Post Partum s/p RLTCS on 24   - Doing well, continue routine post partum care   - BP normotensive, denies s/sx PreE   - EPDS: 1, reports good home support   - Lochia: light    - breast feeding, denies s/sx mastitis   - Last pap smear: 24 NILM, HPV neg   - Indications for 2hr 75g GTT: declines   - Recommend annual well woman exams, due 2025   - Return for 6wk postpartum   - Discussed precautions with regards to lumps noted on axilla and groin    Patient Active Problem List    Diagnosis Date Noted    Encounter for care or examination of mother immediately after delivery 2024    Pregestational diabetes mellitus, modified White class B 2024    Status post repeat low transverse

## 2024-07-22 DIAGNOSIS — N61.0 MASTITIS: Primary | ICD-10-CM

## 2024-07-22 DIAGNOSIS — R42 LIGHTHEADED: ICD-10-CM

## 2024-08-13 ENCOUNTER — POSTPARTUM VISIT (OUTPATIENT)
Age: 34
End: 2024-08-13

## 2024-08-13 VITALS
HEIGHT: 64 IN | SYSTOLIC BLOOD PRESSURE: 108 MMHG | WEIGHT: 194.3 LBS | DIASTOLIC BLOOD PRESSURE: 68 MMHG | BODY MASS INDEX: 33.17 KG/M2

## 2024-08-13 DIAGNOSIS — K52.9 GASTROENTERITIS: ICD-10-CM

## 2024-08-13 DIAGNOSIS — O24.319 MATERNAL PREGESTATIONAL DIABETES CLASSES B THROUGH R, ANTEPARTUM: ICD-10-CM

## 2024-08-13 DIAGNOSIS — Z98.891 H/O: CESAREAN SECTION: ICD-10-CM

## 2024-08-13 PROCEDURE — 0503F POSTPARTUM CARE VISIT: CPT | Performed by: STUDENT IN AN ORGANIZED HEALTH CARE EDUCATION/TRAINING PROGRAM

## 2024-08-13 NOTE — PROGRESS NOTES
Patient here for 6wk PP check and does not desire birth control. Patient states she has a sm open area on her incision that she has been keeping clean and dry.  EPDS-0  
section 2024    RLTCS 24 M Apg 8/9 Wt 9#1 2024    Pregestational DM 2024     Declined GTT, elevated BS, desired to be called pregestational DM  Delivery window well controlled [id number]  24 @ MFM: Start NPH 10u qHS    24- stopped insulin, blood sugars remained controlled  Baseline P/C 0.27 24  ASA81  Fetal echo wnl  NST @32wk  24: pt not currently on insulin  24: MFM recommends 6U qHS. Pt reports fastings <90 and not taking. Strongly recommend compliance with fasting and 2hr PP blood sugar checks  24: Pt reports fastings now are >90 so restarted insulin. Checks fasting blood sugars, uses 12U NPH at night. Reports rarely checking postprandial      Hx Macrosomia 2024     G1, in the presence of uncontrolled GDMA2      High risk pregnancy, antepartum 2023    Hx GDMA2 2023     Declined oral GTT, requesting to check POCT, referral to [facility] placed        Hx C/S x1 2023     Breech presentation, desires TOLAC if presents in spontaneous labor   calculator 56.3%  Counseling completed 24      Acne 2013     Return for annual 2025.    Counseling Completed:  Signs & Symptoms of mastitis and when to notify office discussed.  Secondary smoke risks including increased risks of respiratory problems, Sudden infant death syndrome, and potential malignancies discussed  Abstinence, family planning counseling and STD counseling discussed  Continue with post operative restrictions until 6 weeks post partum  No heavy lifting or [place] until 6 weeks post partum    Andres Coronel DO  [facility]  2024, 10:00 AM

## 2024-11-12 ENCOUNTER — OFFICE VISIT (OUTPATIENT)
Age: 34
End: 2024-11-12
Payer: COMMERCIAL

## 2024-11-12 VITALS
DIASTOLIC BLOOD PRESSURE: 70 MMHG | SYSTOLIC BLOOD PRESSURE: 108 MMHG | WEIGHT: 202 LBS | BODY MASS INDEX: 34.67 KG/M2 | TEMPERATURE: 98.8 F

## 2024-11-12 DIAGNOSIS — J06.9 UPPER RESPIRATORY TRACT INFECTION, UNSPECIFIED TYPE: Primary | ICD-10-CM

## 2024-11-12 PROBLEM — Z30.9 ENCOUNTER FOR CONTRACEPTIVE MANAGEMENT, UNSPECIFIED: Status: ACTIVE | Noted: 2024-11-12

## 2024-11-12 PROBLEM — E28.2 PCO (POLYCYSTIC OVARIES): Status: ACTIVE | Noted: 2024-11-12

## 2024-11-12 PROBLEM — O13.3 GESTATIONAL (PREGNANCY-INDUCED) HYPERTENSION WITHOUT SIGNIFICANT PROTEINURIA, THIRD TRIMESTER: Status: ACTIVE | Noted: 2024-11-12

## 2024-11-12 PROBLEM — O36.63X0 LGA (LARGE FOR GESTATIONAL AGE) FETUS AFFECTING MANAGEMENT OF MOTHER, THIRD TRIMESTER, NOT APPLICABLE OR UNSPECIFIED FETUS: Status: ACTIVE | Noted: 2024-11-12

## 2024-11-12 PROBLEM — O99.810 PREGNANCY WITH ABNORMAL GLUCOSE TOLERANCE TEST (GTT): Status: ACTIVE | Noted: 2024-11-12

## 2024-11-12 PROBLEM — E66.811 OBESITY (BMI 30.0-34.9): Status: ACTIVE | Noted: 2024-11-12

## 2024-11-12 PROBLEM — B97.7 PAPILLOMAVIRUS AS THE CAUSE OF DISEASES CLASSIFIED ELSEWHERE: Status: ACTIVE | Noted: 2024-11-12

## 2024-11-12 PROBLEM — J01.00 ACUTE MAXILLARY SINUSITIS, UNSPECIFIED: Status: ACTIVE | Noted: 2024-11-12

## 2024-11-12 PROBLEM — O12.13 PROTEINURIA AFFECTING PREGNANCY IN THIRD TRIMESTER: Status: ACTIVE | Noted: 2024-11-12

## 2024-11-12 PROBLEM — N92.6 MENSTRUAL DISORDER: Status: ACTIVE | Noted: 2024-11-12

## 2024-11-12 PROBLEM — N96 RECURRENT PREGNANCY LOSS WITHOUT CURRENT PREGNANCY: Status: ACTIVE | Noted: 2024-11-12

## 2024-11-12 PROBLEM — R25.2 CRAMP AND SPASM: Status: ACTIVE | Noted: 2024-11-12

## 2024-11-12 PROBLEM — T75.3XXA MOTION SICKNESS: Status: ACTIVE | Noted: 2024-11-12

## 2024-11-12 PROCEDURE — 99213 OFFICE O/P EST LOW 20 MIN: CPT | Performed by: FAMILY MEDICINE

## 2024-11-12 RX ORDER — AZITHROMYCIN 250 MG/1
TABLET, FILM COATED ORAL
Qty: 6 TABLET | Refills: 0 | Status: SHIPPED | OUTPATIENT
Start: 2024-11-12 | End: 2024-11-22

## 2024-11-12 NOTE — PROGRESS NOTES
[facility]  [facility] MEDICINE  [address]  [facility] [address]  Dept: [phone]     Date of Visit:  2024  Patient Name: Zulma Clark   Patient :  1990     CHIEF COMPLAINT/HPI:     Chief Complaint   Patient presents with    Cough    Chills    Sweats    Anorexia        HPI      Zulma Clark, 34 y.o. presents today for acute visit.    About a week ago, she developed sinus drainage then cough.  She has been having chills and then sweating.   She has had loss of appetite.  She has been trying to drink more water but urine is dark.  She been feeling lightheaded and has brain fog.  Her  is also sick.      REVIEWED INFORMATION      No Known Allergies    Current Outpatient Medications   Medication Sig Dispense Refill    azithromycin (ZITHROMAX) 250 MG tablet 500mg on day 1 followed by 250mg on days 2 - 5 6 tablet 0    ibuprofen (ADVIL;MOTRIN) 600 MG tablet Take 1 tablet by mouth every 6 hours as needed for Pain 120 tablet 1    Prenatal Vit-DSS-Fe Cbn-FA (PRENATAL AD PO) Take by mouth daily      [employer] TRIPLE MAGNESIUM COMPLEX PO Take by mouth       No current facility-administered medications for this visit.        Patient Active Problem List   Diagnosis    Acne    Hx GDMA2    Hx C/S x1    High risk pregnancy, antepartum    Hx Macrosomia    Pregestational DM    Status post repeat low transverse  section    RLTCS 24 M Apg 8 Wt 9#1    Pregestational diabetes mellitus, modified White class B    Encounter for care or examination of mother immediately after delivery    Acute maxillary sinusitis, unspecified    Breech presentation    Cramp and spasm    Encounter for contraceptive management, unspecified    Gestational (pregnancy-induced) hypertension without significant proteinuria, third trimester    LGA (large for gestational age) fetus affecting management of mother, third trimester, not applicable or unspecified fetus    Menstrual

## 2025-01-16 DIAGNOSIS — N61.0 MASTITIS: Primary | ICD-10-CM

## 2025-01-16 RX ORDER — DICLOXACILLIN SODIUM 500 MG/1
500 CAPSULE ORAL 4 TIMES DAILY
Qty: 40 CAPSULE | Refills: 0 | Status: SHIPPED | OUTPATIENT
Start: 2025-01-16 | End: 2025-01-26

## 2025-02-04 ENCOUNTER — OFFICE VISIT (OUTPATIENT)
Age: 35
End: 2025-02-04
Payer: COMMERCIAL

## 2025-02-04 VITALS
BODY MASS INDEX: 36.18 KG/M2 | HEIGHT: 64 IN | SYSTOLIC BLOOD PRESSURE: 114 MMHG | DIASTOLIC BLOOD PRESSURE: 68 MMHG | WEIGHT: 211.9 LBS

## 2025-02-04 DIAGNOSIS — Z01.419 WELL WOMAN EXAM: Primary | ICD-10-CM

## 2025-02-04 DIAGNOSIS — Z87.42 HISTORY OF ABNORMAL CERVICAL PAP SMEAR: ICD-10-CM

## 2025-02-04 PROBLEM — O24.319 MATERNAL PREGESTATIONAL DIABETES CLASSES B THROUGH R, ANTEPARTUM: Status: RESOLVED | Noted: 2024-01-16 | Resolved: 2025-02-04

## 2025-02-04 PROBLEM — Z86.32 HISTORY OF INSULIN CONTROLLED GESTATIONAL DIABETES MELLITUS (GDM): Status: RESOLVED | Noted: 2023-11-13 | Resolved: 2025-02-04

## 2025-02-04 PROBLEM — O99.810 PREGNANCY WITH ABNORMAL GLUCOSE TOLERANCE TEST (GTT): Status: RESOLVED | Noted: 2024-11-12 | Resolved: 2025-02-04

## 2025-02-04 PROBLEM — O09.291 HISTORY OF MACROSOMIA IN INFANT IN PRIOR PREGNANCY, CURRENTLY PREGNANT IN FIRST TRIMESTER: Status: RESOLVED | Noted: 2024-01-02 | Resolved: 2025-02-04

## 2025-02-04 PROBLEM — O36.63X0 LGA (LARGE FOR GESTATIONAL AGE) FETUS AFFECTING MANAGEMENT OF MOTHER, THIRD TRIMESTER, NOT APPLICABLE OR UNSPECIFIED FETUS: Status: RESOLVED | Noted: 2024-11-12 | Resolved: 2025-02-04

## 2025-02-04 PROBLEM — O13.3 GESTATIONAL (PREGNANCY-INDUCED) HYPERTENSION WITHOUT SIGNIFICANT PROTEINURIA, THIRD TRIMESTER: Status: RESOLVED | Noted: 2024-11-12 | Resolved: 2025-02-04

## 2025-02-04 PROBLEM — Z98.891 STATUS POST REPEAT LOW TRANSVERSE CESAREAN SECTION: Status: RESOLVED | Noted: 2024-07-02 | Resolved: 2025-02-04

## 2025-02-04 PROBLEM — O09.90 HIGH RISK PREGNANCY, ANTEPARTUM: Status: RESOLVED | Noted: 2023-12-13 | Resolved: 2025-02-04

## 2025-02-04 PROBLEM — O12.13 PROTEINURIA AFFECTING PREGNANCY IN THIRD TRIMESTER: Status: RESOLVED | Noted: 2024-11-12 | Resolved: 2025-02-04

## 2025-02-04 PROBLEM — O24.319 PREGESTATIONAL DIABETES MELLITUS, MODIFIED WHITE CLASS B: Status: RESOLVED | Noted: 2024-07-03 | Resolved: 2025-02-04

## 2025-02-04 PROCEDURE — 99395 PREV VISIT EST AGE 18-39: CPT | Performed by: STUDENT IN AN ORGANIZED HEALTH CARE EDUCATION/TRAINING PROGRAM

## 2025-02-04 NOTE — PROGRESS NOTES
OB[facility]GYN Annual Visit  [facility]     Zulma Clark  2025                       Primary Care Physician: Naeem Kessler MD    CC:   Chief Complaint   Patient presents with    Annual Exam         HPI: Zulma Clark is a 35 y.o. female  here for an annual visit. She has no complaints today.     Patient's last menstrual period was 2025. Since her  section 24 she has had a few menstrual cycles. These have been overall regular. She stopped breastfeeding last month. She reports her periods are moderate with occasional cramping. She declines any contraception. She is sexually active and her partner plans to get a vasectomy. She does not desire pregnancy and uses natural family planning.    Her bowel habits are regular. She denies any bloating.  She denies dysuria. She denies urinary leaking.  She denies vaginal discharge. She denies any postcoital bleeding or pain with intercourse. She denies any breast complaints. She can still express a small amount of white/clear from the nipples which she could do since her first pregnancy. She occasionally notes some soreness in her left armpit and is unsure if this is related.    Depression Screen: Symptoms of decreased mood absent  Symptoms of anhedonia absent    REVIEW OF SYSTEMS:   Constitutional: negative fever, negative chills, negative weight changes   HEENT: negative visual disturbances, negative headaches, negative dizziness, negative hearing loss  Breast: Negative breast abnormalities, negative breast lumps, positive nipple discharge  Respiratory: negative dyspnea, negative cough, negative SOB  Cardiovascular: negative chest pain,  negative palpitations, negative arrhythmia, negative syncope   Gastrointestinal: negative abdominal pain, negative RUQ pain, negative N/V, negative diarrhea, negative constipation, negative bowel changes, negative heartburn   Genitourinary: negative pelvic pain, negative dysuria, negative hematuria,